# Patient Record
Sex: FEMALE | Race: BLACK OR AFRICAN AMERICAN | NOT HISPANIC OR LATINO | Employment: PART TIME | URBAN - METROPOLITAN AREA
[De-identification: names, ages, dates, MRNs, and addresses within clinical notes are randomized per-mention and may not be internally consistent; named-entity substitution may affect disease eponyms.]

---

## 2019-07-11 ENCOUNTER — OFFICE VISIT (OUTPATIENT)
Dept: FAMILY MEDICINE | Facility: CLINIC | Age: 19
End: 2019-07-11
Payer: COMMERCIAL

## 2019-07-11 VITALS
RESPIRATION RATE: 14 BRPM | HEIGHT: 65 IN | HEART RATE: 97 BPM | DIASTOLIC BLOOD PRESSURE: 68 MMHG | WEIGHT: 199.2 LBS | TEMPERATURE: 98.6 F | SYSTOLIC BLOOD PRESSURE: 110 MMHG | OXYGEN SATURATION: 94 % | BODY MASS INDEX: 33.19 KG/M2

## 2019-07-11 DIAGNOSIS — F41.1 GAD (GENERALIZED ANXIETY DISORDER): Primary | ICD-10-CM

## 2019-07-11 PROCEDURE — 99203 OFFICE O/P NEW LOW 30 MIN: CPT | Performed by: NURSE PRACTITIONER

## 2019-07-11 RX ORDER — FLUOXETINE 40 MG/1
40 CAPSULE ORAL DAILY
Qty: 90 CAPSULE | Refills: 0 | Status: SHIPPED | OUTPATIENT
Start: 2019-07-11 | End: 2019-09-06

## 2019-07-11 RX ORDER — BUPROPION HYDROCHLORIDE 150 MG/1
150 TABLET ORAL EVERY MORNING
Qty: 90 TABLET | Refills: 0 | Status: SHIPPED | OUTPATIENT
Start: 2019-07-11 | End: 2019-09-06

## 2019-07-11 SDOH — HEALTH STABILITY: MENTAL HEALTH: HOW OFTEN DO YOU HAVE A DRINK CONTAINING ALCOHOL?: NEVER

## 2019-07-11 ASSESSMENT — ENCOUNTER SYMPTOMS
WEAKNESS: 0
CHILLS: 0
PALPITATIONS: 0
NAUSEA: 0
SORE THROAT: 0
HEARTBURN: 0
NERVOUS/ANXIOUS: 1
ARTHRALGIAS: 0
SHORTNESS OF BREATH: 0
PARESTHESIAS: 0
BREAST MASS: 0
DIZZINESS: 0
HEMATURIA: 0
DYSURIA: 0
CONSTIPATION: 0
EYE PAIN: 0
HEADACHES: 1
ABDOMINAL PAIN: 0
MYALGIAS: 0
FEVER: 0
HEMATOCHEZIA: 0
DIARRHEA: 0
JOINT SWELLING: 0
COUGH: 0
FREQUENCY: 0

## 2019-07-11 ASSESSMENT — ANXIETY QUESTIONNAIRES
GAD7 TOTAL SCORE: 4
IF YOU CHECKED OFF ANY PROBLEMS ON THIS QUESTIONNAIRE, HOW DIFFICULT HAVE THESE PROBLEMS MADE IT FOR YOU TO DO YOUR WORK, TAKE CARE OF THINGS AT HOME, OR GET ALONG WITH OTHER PEOPLE: SOMEWHAT DIFFICULT
3. WORRYING TOO MUCH ABOUT DIFFERENT THINGS: SEVERAL DAYS
1. FEELING NERVOUS, ANXIOUS, OR ON EDGE: SEVERAL DAYS
5. BEING SO RESTLESS THAT IT IS HARD TO SIT STILL: NOT AT ALL
7. FEELING AFRAID AS IF SOMETHING AWFUL MIGHT HAPPEN: NOT AT ALL
6. BECOMING EASILY ANNOYED OR IRRITABLE: SEVERAL DAYS
2. NOT BEING ABLE TO STOP OR CONTROL WORRYING: SEVERAL DAYS

## 2019-07-11 ASSESSMENT — MIFFLIN-ST. JEOR: SCORE: 1670.48

## 2019-07-11 ASSESSMENT — PATIENT HEALTH QUESTIONNAIRE - PHQ9
SUM OF ALL RESPONSES TO PHQ QUESTIONS 1-9: 9
5. POOR APPETITE OR OVEREATING: NOT AT ALL

## 2019-07-11 NOTE — PATIENT INSTRUCTIONS
Start the wellbutrin in the morning.  Remember, if you have some mild side effects, these should resolve after the first week.  You should start to feel some sort of an improvement over the first 2 weeks.  Let me know if you do not.  See me in 6-8 weeks for a recheck, sooner if necessary.  Continue the prozac as you are.

## 2019-07-11 NOTE — PROGRESS NOTES
"Subjective     Mindi B Skyler Bourne is a 19 year old female who presents to clinic today for the following health issues:    HPI       Pt here to establish care.  She has been treated in the past for mood, but not recently.  She has for the last year been taking her mother's prozac.  She has slowly increased her dose and has been stable on 40mg daily.  She feels this is helpful for her anxiety.  No panic attacks.  She has social anxiety as well as generalized anxiety which cause her to isolate herself.  Currently she is having difficulty falling asleep.  She denies any symptoms of depression currently.      Patient Active Problem List   Diagnosis     Suicide attempt (H)     OLI (generalized anxiety disorder)     History reviewed. No pertinent surgical history.    Social History     Tobacco Use     Smoking status: Never Smoker     Smokeless tobacco: Never Used   Substance Use Topics     Alcohol use: Never     Frequency: Never     History reviewed. No pertinent family history.          Reviewed and updated as needed this visit by Provider  Tobacco  Allergies  Meds  Problems  Med Hx  Surg Hx  Fam Hx         Review of Systems   SEE HPI.      Objective    /68 (BP Location: Right arm, Patient Position: Chair, Cuff Size: Adult Regular)   Pulse 97   Temp 98.6  F (37  C) (Tympanic)   Resp 14   Ht 1.645 m (5' 4.75\")   Wt 90.4 kg (199 lb 3.2 oz)   LMP 06/20/2019 (Approximate)   SpO2 94%   BMI 33.41 kg/m    Body mass index is 33.41 kg/m .  Physical Exam   GENERAL: healthy, alert and no distress  PSYCH: mentation appears normal and anxious    Diagnostic Test Results:  Labs reviewed in Epic        Assessment & Plan     1. OLI (generalized anxiety disorder)  Discussed options.  Recommended counseling.  Sent with contact info to [facility], pt will schedule independently.  Continue prozac.  Add wellbutrin.  Reviewed r/b/se.  Pt agrees with plan and verbalized understanding.  - FLUoxetine " "(PROZAC) 40 MG capsule; Take 1 capsule (40 mg) by mouth daily  Dispense: 90 capsule; Refill: 0  - buPROPion (WELLBUTRIN XL) 150 MG 24 hr tablet; Take 1 tablet (150 mg) by mouth every morning  Dispense: 90 tablet; Refill: 0     BMI:   Estimated body mass index is 33.41 kg/m  as calculated from the following:    Height as of this encounter: 1.645 m (5' 4.75\").    Weight as of this encounter: 90.4 kg (199 lb 3.2 oz).     Return in about 6 weeks (around 8/22/2019).    ALFRED Rosario Ra [facility]      "

## 2019-07-11 NOTE — PROGRESS NOTES
"   SUBJECTIVE:   CC: Mindiranulfo Bourne is an 19 year old woman who presents for preventive health visit.     Healthy Habits:     Getting at least 3 servings of Calcium per day:  Yes    Bi-annual eye exam:  NO    Dental care twice a year:  NO    Sleep apnea or symptoms of sleep apnea:  None    Diet:  Vegetarian/vegan    Frequency of exercise:  1 day/week    Duration of exercise:  15-30 minutes    Taking medications regularly:  Yes    Medication side effects:  Not applicable    PHQ-2 Total Score: 1    Additional concerns today:  No          {additional problems to add (Optional):914583}    Today's PHQ-2 Score:   PHQ-2 ( 1999 [employer]) 7/11/2019   Q1: Little interest or pleasure in doing things 1   Q2: Feeling down, depressed or hopeless 0   PHQ-2 Score 1   Q1: Little interest or pleasure in doing things Several days   Q2: Feeling down, depressed or hopeless Not at all   PHQ-2 Score 1       Abuse: Current or Past(Physical, Sexual or Emotional)- No  Do you feel safe in your environment? Yes    Social History     Tobacco Use     Smoking status: Never Smoker     Smokeless tobacco: Never Used   Substance Use Topics     Alcohol use: Never     Frequency: Never         Alcohol Use 7/11/2019   Prescreen: >3 drinks/day or >7 drinks/week? Not Applicable   {add AUDIT responses (Optional) (A score of 7 for adult men is an indication of hazardous drinking; a score of 8 or more is an indication of an alcohol use disorder.  A score of 7 or more for adult women is an indication of hazardous drinking or an alchohol use disorder):096573}    Reviewed orders with patient.  Reviewed health maintenance and updated orders accordingly - { :871889::\"Yes\"}  {Chronicprobdata (optional):886538}    {Mammo Decision Support (Optional):287986}    Pertinent mammograms are reviewed under the imaging tab.  History of abnormal Pap smear: { :317906}     Reviewed and updated as needed this visit by clinical staff  Tobacco  Allergies  Meds  Med Hx  Surg " "Hx  Fam Hx  Soc Hx        Reviewed and updated as needed this visit by Provider        {HISTORY OPTIONS (Optional):595208}    Review of Systems   Constitutional: Negative for chills and fever.   HENT: Positive for ear pain. Negative for congestion, hearing loss and sore throat.    Eyes: Negative for pain and visual disturbance.   Respiratory: Negative for cough and shortness of breath.    Cardiovascular: Negative for chest pain, palpitations and peripheral edema.   Gastrointestinal: Negative for abdominal pain, constipation, diarrhea, heartburn, hematochezia and nausea.   Breasts:  Negative for tenderness, breast mass and discharge.   Genitourinary: Negative for dysuria, frequency, genital sores, hematuria, pelvic pain, urgency, vaginal bleeding and vaginal discharge.   Musculoskeletal: Negative for arthralgias, joint swelling and myalgias.   Skin: Negative for rash.   Neurological: Positive for headaches. Negative for dizziness, weakness and paresthesias.   Psychiatric/Behavioral: Positive for mood changes. The patient is nervous/anxious.      {FEMALE ROS (Optional):702885}     OBJECTIVE:   There were no vitals taken for this visit.  Physical Exam  {Exam Choices (Optional):364859}    {Diagnostic Test Results (Optional):181039::\"Diagnostic Test Results:\",\"Labs reviewed in Epic\"}    ASSESSMENT/PLAN:   {Diag Picklist:398753}    COUNSELING:  {FEMALE COUNSELING MESSAGES:212519::\"Reviewed preventive health counseling, as reflected in patient instructions\"}    Estimated body mass index is 202.36 kg/m  as calculated from the following:    Height as of 7/28/13: 0.633 m (2' 0.92\").    Weight as of 7/28/13: 81.1 kg (178 lb 12 oz).    {Weight Management Plan (ACO) Complete if BMI is abnormal-  Ages 18-64  BMI >24.9.  Age 65+ with BMI <23 or >30 (Optional):276559}     reports that she has never smoked. She has never used smokeless tobacco.  {Tobacco Cessation -- Complete if patient is a smoker (Optional):702373}    Counseling " Resources:  ATP IV Guidelines  Pooled Cohorts Equation Calculator  Breast Cancer Risk Calculator  FRAX Risk Assessment  ICSI Preventive Guidelines  Dietary Guidelines for Americans, 2010  USDA's MyPlate  ASA Prophylaxis  Lung CA Screening    ALFRED Rosario Ra, CNP  [facility]

## 2019-07-12 ASSESSMENT — ANXIETY QUESTIONNAIRES: GAD7 TOTAL SCORE: 4

## 2019-09-05 NOTE — PROGRESS NOTES
Subjective     Mindi DUNCAN Skyler Bourne is a 19 year old female who presents to clinic today for the following health issues:    HPI   Depression and Anxiety Follow-Up    How are you doing with your depression since your last visit? Improved     How are you doing with your anxiety since your last visit?  Improved     Are you having other symptoms that might be associated with depression or anxiety? No    Have you had a significant life event? No     Do you have any concerns with your use of alcohol or other drugs? No    Social History     Tobacco Use     Smoking status: Never Smoker     Smokeless tobacco: Never Used   Substance Use Topics     Alcohol use: Never     Frequency: Never     Drug use: None     PHQ 7/11/2019 9/6/2019   PHQ-9 Total Score 9 4   Q9: Thoughts of better off dead/self-harm past 2 weeks Not at all Not at all     OLI-7 SCORE 7/11/2019 9/6/2019   Total Score 4 6           Suicide Assessment Five-step Evaluation and Treatment (SAFE-T)      How many servings of fruits and vegetables do you eat daily?  0-1    On average, how many sweetened beverages do you drink each day (soda, juice, sweet tea, etc)?   0    How many days per week do you miss taking your medication? 0    Pt here for follow up.  Symptoms improved with addition of wellbutrin to prozac.  Has been more active with family, less isolated.  Lower anxiety.  Wonders about dose increase to help further.  Made appt with therapist but needed to reschedule.  Plans on pursuing this.  Some trouble sleeping over the past 1 week.  This happens on occasion.    Patient Active Problem List   Diagnosis     Suicide attempt (H)     OLI (generalized anxiety disorder)     History reviewed. No pertinent surgical history.    Social History     Tobacco Use     Smoking status: Never Smoker     Smokeless tobacco: Never Used   Substance Use Topics     Alcohol use: Never     Frequency: Never     History reviewed. No pertinent family history.          Reviewed and updated  "as needed this visit by Provider  Tobacco  Allergies  Meds  Problems  Med Hx  Surg Hx  Fam Hx         Review of Systems   SEE HPI.      Objective    /64 (BP Location: Right arm, Patient Position: Chair, Cuff Size: Adult Large)   Pulse 103   Temp 98.6  F (37  C) (Tympanic)   Resp 16   Wt 91.8 kg (202 lb 4.8 oz)   LMP 08/16/2019 (Approximate)   SpO2 95%   BMI 33.92 kg/m    Body mass index is 33.92 kg/m .  Physical Exam   GENERAL: healthy, alert and no distress  PSYCH: mentation appears normal, affect normal/bright    Diagnostic Test Results:  Labs reviewed in Epic        Assessment & Plan     1. OLI (generalized anxiety disorder)  Discussed options with pt and mom.  Mom with hx of rapid metabolizing.  Possible Mindi also metabolizes fast as she has required higher doses of prozac.  Increase prozac now.  Consider wellbutrin increase in the future.  Pt agrees with plan and verbalized understanding.  - FLUoxetine (PROZAC) 20 MG capsule; Take 3 capsules (60 mg) by mouth daily  Dispense: 270 capsule; Refill: 1  - buPROPion (WELLBUTRIN XL) 150 MG 24 hr tablet; Take 1 tablet (150 mg) by mouth every morning  Dispense: 90 tablet; Refill: 0    2. Insomnia, unspecified type  Discussed options.  Start mirtazapine.  Okay to use this prn.  Reviewed r/b/se.  Pt agrees with plan and verbalized understanding.  - FLUoxetine (PROZAC) 20 MG capsule; Take 3 capsules (60 mg) by mouth daily  Dispense: 270 capsule; Refill: 1  - mirtazapine (REMERON) 7.5 MG tablet; Take 1 tablet (7.5 mg) by mouth At Bedtime  Dispense: 30 tablet; Refill: 1     BMI:   Estimated body mass index is 33.92 kg/m  as calculated from the following:    Height as of 7/11/19: 1.645 m (5' 4.75\").    Weight as of this encounter: 91.8 kg (202 lb 4.8 oz).     Return in about 2 months (around 11/6/2019) for follow up.    Kassi Montiel, APRN CNP  [facility]      "

## 2019-09-06 ENCOUNTER — OFFICE VISIT (OUTPATIENT)
Dept: FAMILY MEDICINE | Facility: CLINIC | Age: 19
End: 2019-09-06
Payer: COMMERCIAL

## 2019-09-06 VITALS
OXYGEN SATURATION: 95 % | TEMPERATURE: 98.6 F | DIASTOLIC BLOOD PRESSURE: 64 MMHG | SYSTOLIC BLOOD PRESSURE: 100 MMHG | RESPIRATION RATE: 16 BRPM | BODY MASS INDEX: 33.92 KG/M2 | WEIGHT: 202.3 LBS | HEART RATE: 103 BPM

## 2019-09-06 DIAGNOSIS — G47.00 INSOMNIA, UNSPECIFIED TYPE: ICD-10-CM

## 2019-09-06 DIAGNOSIS — F41.1 GAD (GENERALIZED ANXIETY DISORDER): Primary | ICD-10-CM

## 2019-09-06 PROCEDURE — 99214 OFFICE O/P EST MOD 30 MIN: CPT | Performed by: NURSE PRACTITIONER

## 2019-09-06 RX ORDER — MIRTAZAPINE 7.5 MG/1
7.5 TABLET, FILM COATED ORAL AT BEDTIME
Qty: 30 TABLET | Refills: 1 | Status: SHIPPED | OUTPATIENT
Start: 2019-09-06 | End: 2019-11-06

## 2019-09-06 RX ORDER — BUPROPION HYDROCHLORIDE 150 MG/1
150 TABLET ORAL EVERY MORNING
Qty: 90 TABLET | Refills: 0 | Status: SHIPPED | OUTPATIENT
Start: 2019-09-06 | End: 2019-11-06

## 2019-09-06 ASSESSMENT — PATIENT HEALTH QUESTIONNAIRE - PHQ9
5. POOR APPETITE OR OVEREATING: SEVERAL DAYS
SUM OF ALL RESPONSES TO PHQ QUESTIONS 1-9: 4

## 2019-09-06 ASSESSMENT — ANXIETY QUESTIONNAIRES
1. FEELING NERVOUS, ANXIOUS, OR ON EDGE: SEVERAL DAYS
3. WORRYING TOO MUCH ABOUT DIFFERENT THINGS: SEVERAL DAYS
GAD7 TOTAL SCORE: 6
6. BECOMING EASILY ANNOYED OR IRRITABLE: SEVERAL DAYS
2. NOT BEING ABLE TO STOP OR CONTROL WORRYING: NOT AT ALL
7. FEELING AFRAID AS IF SOMETHING AWFUL MIGHT HAPPEN: SEVERAL DAYS
5. BEING SO RESTLESS THAT IT IS HARD TO SIT STILL: SEVERAL DAYS

## 2019-09-06 NOTE — PATIENT INSTRUCTIONS
Start the increased dose of prozac (60mg daily).    Continue the wellbutrin as you are.    If your sleep continues to be an issue go ahead and start the mirtazapine- you can take this for a short time, or if it is helping with anxiety you can continue it as a daily medication.    Continue with the plan for counseling.

## 2019-09-07 ASSESSMENT — ANXIETY QUESTIONNAIRES: GAD7 TOTAL SCORE: 6

## 2019-09-30 DIAGNOSIS — G47.00 INSOMNIA, UNSPECIFIED TYPE: ICD-10-CM

## 2019-09-30 NOTE — TELEPHONE ENCOUNTER
"Requested Prescriptions   Pending Prescriptions Disp Refills     mirtazapine (REMERON) 7.5 MG tablet [Pharmacy Med Name: MIRTAZAPINE 7.5 MG TABLET] 30 tablet 1     Sig: TAKE 1 TABLET (7.5 MG) BY MOUTH AT BEDTIME   Last Written Prescription Date:  9/6/19  Last Fill Quantity: 30,  # refills: 1   Last office visit: 9/6/2019 with prescribing provider:  Kassi Montiel Ra, APRN CNP   Future Office Visit:        Atypical Antidepressants Protocol Passed - 9/30/2019 11:47 AM        Passed - Recent (12 mo) or future (30 days) visit within the authorizing provider's specialty     Patient has had an office visit with the authorizing provider or a provider within the authorizing providers department within the previous 12 mos or has a future within next 30 days. See \"Patient Info\" tab in inbasket, or \"Choose Columns\" in Meds & Orders section of the refill encounter.              Passed - Medication active on med list        Passed - Patient is age 18 or older        Passed - No active pregnancy on record        Passed - No positive pregnancy test in past 12 mos          "

## 2019-10-01 DIAGNOSIS — F41.1 GAD (GENERALIZED ANXIETY DISORDER): ICD-10-CM

## 2019-10-01 NOTE — TELEPHONE ENCOUNTER
"Requested Prescriptions   Pending Prescriptions Disp Refills     FLUoxetine (PROZAC) 40 MG capsule [Pharmacy Med Name: FLUOXETINE HCL 40 MG CAPSULE] 90 capsule 0     Sig: TAKE 1 CAPSULE BY MOUTH EVERY DAY   Last Written Prescription Date:  9/6/19  Last Fill Quantity: 270,  # refills: 1   Last office visit: 9/6/2019 with prescribing provider:  Kassi Montiel Ra, APRN CNP   Future Office Visit:        SSRIs Protocol Passed - 10/1/2019  1:38 AM   PHQ-9 SCORE 7/11/2019 9/6/2019   PHQ-9 Total Score 9 4     OLI-7 SCORE 7/11/2019 9/6/2019   Total Score 4 6            Passed - Recent (12 mo) or future (30 days) visit within the authorizing provider's specialty     Patient has had an office visit with the authorizing provider or a provider within the authorizing providers department within the previous 12 mos or has a future within next 30 days. See \"Patient Info\" tab in inbasket, or \"Choose Columns\" in Meds & Orders section of the refill encounter.              Passed - Medication is active on med list        Passed - Patient is age 18 or older        Passed - No active pregnancy on record        Passed - No positive pregnancy test in last 12 months          "

## 2019-10-02 RX ORDER — FLUOXETINE 40 MG/1
CAPSULE ORAL
Qty: 90 CAPSULE | Refills: 0 | OUTPATIENT
Start: 2019-10-02

## 2019-10-02 RX ORDER — MIRTAZAPINE 7.5 MG/1
7.5 TABLET, FILM COATED ORAL AT BEDTIME
Qty: 30 TABLET | Refills: 1 | OUTPATIENT
Start: 2019-10-02

## 2019-10-02 NOTE — TELEPHONE ENCOUNTER
Refused noting that it is to early to fill. Noted to see the 9/6/19 Rx.    Rita Bailey RN, [facility] Triage

## 2019-10-03 ENCOUNTER — HEALTH MAINTENANCE LETTER (OUTPATIENT)
Age: 19
End: 2019-10-03

## 2019-10-29 ENCOUNTER — TELEPHONE (OUTPATIENT)
Dept: FAMILY MEDICINE | Facility: CLINIC | Age: 19
End: 2019-10-29

## 2019-10-29 NOTE — TELEPHONE ENCOUNTER
Reason for call:  Other   Patient called regarding (reason for call): appointment  Additional comments: Needs med ck before 11/08 - requesting appointment with Kassi Montiel     Phone number to reach patient:   phone number:  *[phone]    Best Time:  Any    Can we leave a detailed message on this number?  No             Jihan Hill on 10/29/2019 at 3:23 PM

## 2019-10-30 NOTE — TELEPHONE ENCOUNTER
Called patient, states she will call back with schedule.  Offered patient TANVI with MAURO on Monday 11/4/2019 or Wednesday 11/6/2019.    Ivy Cabral CMA

## 2019-11-06 ENCOUNTER — OFFICE VISIT (OUTPATIENT)
Dept: FAMILY MEDICINE | Facility: CLINIC | Age: 19
End: 2019-11-06
Payer: COMMERCIAL

## 2019-11-06 VITALS
WEIGHT: 204.7 LBS | RESPIRATION RATE: 16 BRPM | DIASTOLIC BLOOD PRESSURE: 64 MMHG | SYSTOLIC BLOOD PRESSURE: 102 MMHG | TEMPERATURE: 98.4 F | OXYGEN SATURATION: 94 % | HEART RATE: 103 BPM | BODY MASS INDEX: 34.33 KG/M2

## 2019-11-06 DIAGNOSIS — G47.00 INSOMNIA, UNSPECIFIED TYPE: ICD-10-CM

## 2019-11-06 DIAGNOSIS — F41.1 GAD (GENERALIZED ANXIETY DISORDER): ICD-10-CM

## 2019-11-06 PROCEDURE — 99213 OFFICE O/P EST LOW 20 MIN: CPT | Performed by: NURSE PRACTITIONER

## 2019-11-06 RX ORDER — MIRTAZAPINE 7.5 MG/1
7.5 TABLET, FILM COATED ORAL AT BEDTIME
Qty: 30 TABLET | Refills: 1 | Status: SHIPPED | OUTPATIENT
Start: 2019-11-06 | End: 2020-06-02

## 2019-11-06 RX ORDER — HYDROXYZINE PAMOATE 25 MG/1
25-50 CAPSULE ORAL DAILY PRN
Qty: 30 CAPSULE | Refills: 0 | Status: SHIPPED | OUTPATIENT
Start: 2019-11-06 | End: 2021-07-30

## 2019-11-06 RX ORDER — BUPROPION HYDROCHLORIDE 150 MG/1
150 TABLET ORAL EVERY MORNING
Qty: 90 TABLET | Refills: 0 | Status: SHIPPED | OUTPATIENT
Start: 2019-11-06 | End: 2020-01-27

## 2019-11-06 ASSESSMENT — ANXIETY QUESTIONNAIRES
3. WORRYING TOO MUCH ABOUT DIFFERENT THINGS: NOT AT ALL
6. BECOMING EASILY ANNOYED OR IRRITABLE: SEVERAL DAYS
7. FEELING AFRAID AS IF SOMETHING AWFUL MIGHT HAPPEN: NOT AT ALL
1. FEELING NERVOUS, ANXIOUS, OR ON EDGE: SEVERAL DAYS
5. BEING SO RESTLESS THAT IT IS HARD TO SIT STILL: NOT AT ALL
GAD7 TOTAL SCORE: 2
2. NOT BEING ABLE TO STOP OR CONTROL WORRYING: NOT AT ALL

## 2019-11-06 ASSESSMENT — PATIENT HEALTH QUESTIONNAIRE - PHQ9
SUM OF ALL RESPONSES TO PHQ QUESTIONS 1-9: 6
5. POOR APPETITE OR OVEREATING: NOT AT ALL

## 2019-11-06 NOTE — PROGRESS NOTES
Subjective     Mindi DUNCAN Skyler Bourne is a 19 year old female who presents to clinic today for the following health issues:    HPI   Anxiety Follow-Up    How are you doing with your anxiety since your last visit? Improved     Are you having other symptoms that might be associated with anxiety? No    Have you had a significant life event? No     Are you feeling depressed? No    Do you have any concerns with your use of alcohol or other drugs? No    Social History     Tobacco Use     Smoking status: Never Smoker     Smokeless tobacco: Never Used   Substance Use Topics     Alcohol use: Never     Frequency: Never     Drug use: None     OLI-7 SCORE 7/11/2019 9/6/2019 11/6/2019   Total Score 4 6 2     PHQ 7/11/2019 9/6/2019 11/6/2019   PHQ-9 Total Score 9 4 6   Q9: Thoughts of better off dead/self-harm past 2 weeks Not at all Not at all Not at all           How many servings of fruits and vegetables do you eat daily?  0-1    On average, how many sweetened beverages do you drink each day (soda, juice, sweet tea, etc)?   0  How many days per week do you miss taking your medication? 1    What makes it hard for you to take your medications?  remembering to take    Pt continues to do well with meds.  Did not start mirtazapine.  Will trial now.  She is concerned about her upcoming trip.  Interested in as needed med such as hydroxyzine.  Started counseling, going well.      Patient Active Problem List   Diagnosis     Suicide attempt (H)     OLI (generalized anxiety disorder)     History reviewed. No pertinent surgical history.    Social History     Tobacco Use     Smoking status: Never Smoker     Smokeless tobacco: Never Used   Substance Use Topics     Alcohol use: Never     Frequency: Never     History reviewed. No pertinent family history.          Reviewed and updated as needed this visit by Provider  Tobacco  Allergies  Meds  Problems  Med Hx  Surg Hx  Fam Hx         Review of Systems   SEE HPI.      Objective    BP  "102/64 (BP Location: Right arm, Patient Position: Chair, Cuff Size: Adult Large)   Pulse 103   Temp 98.4  F (36.9  C) (Tympanic)   Resp 16   Wt 92.9 kg (204 lb 11.2 oz)   LMP 10/15/2019 (Approximate)   SpO2 94%   BMI 34.33 kg/m    Body mass index is 34.33 kg/m .  Physical Exam   GENERAL: healthy, alert and no distress  PSYCH: mentation appears normal, affect normal/bright    Diagnostic Test Results:  Labs reviewed in Epic        Assessment & Plan     1. OLI (generalized anxiety disorder)  Discussed options.  Refilled meds, hydroxyzine prn anxiety in the airport or on trip.  - buPROPion (WELLBUTRIN XL) 150 MG 24 hr tablet; Take 1 tablet (150 mg) by mouth every morning  Dispense: 90 tablet; Refill: 0  - FLUoxetine (PROZAC) 20 MG capsule; Take 3 capsules (60 mg) by mouth daily  Dispense: 270 capsule; Refill: 1  - hydrOXYzine (VISTARIL) 25 MG capsule; Take 1-2 capsules (25-50 mg) by mouth daily as needed for anxiety  Dispense: 30 capsule; Refill: 0    2. Insomnia, unspecified type  Continue meds, trial mirtazapine before trip.  - FLUoxetine (PROZAC) 20 MG capsule; Take 3 capsules (60 mg) by mouth daily  Dispense: 270 capsule; Refill: 1  - mirtazapine (REMERON) 7.5 MG tablet; Take 1 tablet (7.5 mg) by mouth At Bedtime  Dispense: 30 tablet; Refill: 1     BMI:   Estimated body mass index is 34.33 kg/m  as calculated from the following:    Height as of 7/11/19: 1.645 m (5' 4.75\").    Weight as of this encounter: 92.9 kg (204 lb 11.2 oz).       Return in about 3 months (around 2/6/2020) for follow up.    ALFRED Rosario Ra [facility]      "

## 2019-11-07 ASSESSMENT — ANXIETY QUESTIONNAIRES: GAD7 TOTAL SCORE: 2

## 2019-12-11 DIAGNOSIS — G47.00 INSOMNIA, UNSPECIFIED TYPE: ICD-10-CM

## 2019-12-12 RX ORDER — MIRTAZAPINE 7.5 MG/1
7.5 TABLET, FILM COATED ORAL AT BEDTIME
Qty: 30 TABLET | Refills: 1 | Status: SHIPPED | OUTPATIENT
Start: 2019-12-12 | End: 2020-01-02

## 2019-12-12 NOTE — TELEPHONE ENCOUNTER
"Requested Prescriptions   Signed Prescriptions Disp Refills    mirtazapine (REMERON) 7.5 MG tablet 30 tablet 1     Sig: TAKE 1 TABLET (7.5 MG) BY MOUTH AT BEDTIME       Atypical Antidepressants Protocol Passed - 12/11/2019  7:37 AM        Passed - Recent (12 mo) or future (30 days) visit within the authorizing provider's specialty     Patient has had an office visit with the authorizing provider or a provider within the authorizing providers department within the previous 12 mos or has a future within next 30 days. See \"Patient Info\" tab in inbasket, or \"Choose Columns\" in Meds & Orders section of the refill encounter.              Passed - Medication active on med list        Passed - Patient is age 18 or older        Passed - No active pregnancy on record        Passed - No positive pregnancy test in past 12 mos          "

## 2019-12-31 DIAGNOSIS — G47.00 INSOMNIA, UNSPECIFIED TYPE: ICD-10-CM

## 2020-01-02 RX ORDER — MIRTAZAPINE 7.5 MG/1
7.5 TABLET, FILM COATED ORAL AT BEDTIME
Qty: 90 TABLET | Refills: 1 | Status: SHIPPED | OUTPATIENT
Start: 2020-01-02 | End: 2020-06-02

## 2020-01-27 DIAGNOSIS — F41.1 GAD (GENERALIZED ANXIETY DISORDER): ICD-10-CM

## 2020-01-27 NOTE — TELEPHONE ENCOUNTER
Routing refill request to provider for review/approval because:  Patient needs to be seen because:  Due for f/u appt and phq9 > 4    Will forward to the station, please try and help the pt schedule an appt for a med check/mood recheck

## 2020-01-27 NOTE — TELEPHONE ENCOUNTER
"Requested Prescriptions   Pending Prescriptions Disp Refills     buPROPion (WELLBUTRIN XL) 150 MG 24 hr tablet [Pharmacy Med Name:  Last Written Prescription Date:  11/6/19  Last Fill Quantity: 90,  # refills: 0   Last Office Visit: 11/6/2019   Future Office Visit:      BUPROPION HCL  MG TABLET] 90 tablet 0     Sig: TAKE 1 TABLET BY MOUTH EVERY MORNING       SSRIs Protocol Passed - 1/27/2020  1:50 AM   PHQ-9 SCORE 7/11/2019 9/6/2019 11/6/2019   PHQ-9 Total Score 9 4 6     OLI-7 SCORE 7/11/2019 9/6/2019 11/6/2019   Total Score 4 6 2        Passed - Recent (12 mo) or future (30 days) visit within the authorizing provider's specialty     Patient has had an office visit with the authorizing provider or a provider within the authorizing providers department within the previous 12 mos or has a future within next 30 days. See \"Patient Info\" tab in inbasket, or \"Choose Columns\" in Meds & Orders section of the refill encounter.              Passed - Medication is Bupropion     If the medication is Bupropion (Wellbutrin), and the patient is taking for smoking cessation; OK to refill.          Passed - Medication is active on med list        Passed - Patient is age 18 or older        Passed - No active pregnancy on record        Passed - No positive pregnancy test in last 12 months          "

## 2020-01-29 RX ORDER — BUPROPION HYDROCHLORIDE 150 MG/1
TABLET ORAL
Qty: 90 TABLET | Refills: 0 | Status: SHIPPED | OUTPATIENT
Start: 2020-01-29 | End: 2020-06-02

## 2020-03-18 ENCOUNTER — TELEPHONE (OUTPATIENT)
Dept: FAMILY MEDICINE | Facility: CLINIC | Age: 20
End: 2020-03-18

## 2020-03-18 NOTE — TELEPHONE ENCOUNTER
"Reason for call:  Symptom   Symptom or request: Possible Bartholin's gland cyst.     Duration (how long have symptoms been present): started yesterday.   Have you been treated for this before? No    Additional comments: currently the size of a \"fat pinky\" and very painful.     Phone number to reach patient:  Cell number on file:    Telephone Information:   Mobile [phone]       Best Time:  any    Can we leave a detailed message on this number?  YES    Travel screening: Not Applicable       Jihan Hill on 3/18/2020 at 12:26 PM    "

## 2020-03-18 NOTE — TELEPHONE ENCOUNTER
Called the pt to discuss.    Yesterday afternoon, she discovered a cyst on her labia.  She thinks it has doubled in size since then.  It is hard for her to walk.  She has taken a bath with epson salt.  That does not seem to be doing anything.  She says it is pretty big.      Had discussed with Coretta Zuluaga.  She had advised if it is really big, she could do an e-visit.      Advised pt to submit an e-visit.  Directions given.

## 2020-05-31 DIAGNOSIS — F41.1 GAD (GENERALIZED ANXIETY DISORDER): ICD-10-CM

## 2020-06-01 ENCOUNTER — MYC MEDICAL ADVICE (OUTPATIENT)
Dept: FAMILY MEDICINE | Facility: CLINIC | Age: 20
End: 2020-06-01

## 2020-06-01 RX ORDER — BUPROPION HYDROCHLORIDE 150 MG/1
TABLET ORAL
Qty: 30 TABLET | Refills: 0 | OUTPATIENT
Start: 2020-06-01

## 2020-06-01 NOTE — PROGRESS NOTES
"Mindi Bourne is a 20 year old female who is being evaluated via a billable telephone visit.      The patient has been notified of following:     \"This telephone visit will be conducted via a call between you and your physician/provider. We have found that certain health care needs can be provided without the need for a physical exam.  This service lets us provide the care you need with a short phone conversation.  If a prescription is necessary we can send it directly to your pharmacy.  If lab work is needed we can place an order for that and you can then stop by our lab to have the test done at a later time.    Telephone visits are billed at different rates depending on your insurance coverage. During this emergency period, for some insurers they may be billed the same as an in-person visit.  Please reach out to your insurance provider with any questions.    If during the course of the call the physician/provider feels a telephone visit is not appropriate, you will not be charged for this service.\"    Patient has given verbal consent for Telephone visit?  Yes    What phone number would you like to be contacted at? [phone]    How would you like to obtain your AVS? Mail a copy    Subjective     Mindi Bourne is a 20 year old female who presents via phone visit today for the following health issues:    HPI  Anxiety Follow-Up    How are you doing with your anxiety since your last visit? Improved     Are you having other symptoms that might be associated with anxiety? Yes:  Troubles Sleeping    Have you had a significant life event? No     Are you feeling depressed? No    Do you have any concerns with your use of alcohol or other drugs? No    Social History     Tobacco Use     Smoking status: Never Smoker     Smokeless tobacco: Never Used   Substance Use Topics     Alcohol use: Never     Frequency: Never     Drug use: Never     OLI-7 SCORE 9/6/2019 11/6/2019 6/2/2020   Total Score 6 2 2     PHQ " 9/6/2019 11/6/2019 6/2/2020   PHQ-9 Total Score 4 6 3   Q9: Thoughts of better off dead/self-harm past 2 weeks Not at all Not at all Not at all     Doing well.  Was having issues with sleep but again is sleeping well.  Mood has been stable.  Anxiety well controlled.  Compliant with meds.      How many servings of fruits and vegetables do you eat daily?  0-1    On average, how many sweetened beverages do you drink each day (Examples: soda, juice, sweet tea, etc.  Do NOT count diet or artificially sweetened beverages)?   1    How many days per week do you exercise enough to make your heart beat faster? 3 or less    How many minutes a day do you exercise enough to make your heart beat faster? 20 - 29    How many days per week do you miss taking your medication? 0        Patient Active Problem List   Diagnosis     Suicide attempt (H)     OLI (generalized anxiety disorder)     History reviewed. No pertinent surgical history.    Social History     Tobacco Use     Smoking status: Never Smoker     Smokeless tobacco: Never Used   Substance Use Topics     Alcohol use: Never     Frequency: Never     History reviewed. No pertinent family history.        Reviewed and updated as needed this visit by Provider  Tobacco  Allergies  Meds  Problems  Med Hx  Surg Hx  Fam Hx         Review of Systems   Constitutional, HEENT, cardiovascular, pulmonary, gi and gu systems are negative, except as otherwise noted.       Objective   Reported vitals:  There were no vitals taken for this visit.   healthy, alert and no distress  PSYCH: Alert and oriented times 3; coherent speech, normal   rate and volume, able to articulate logical thoughts, able   to abstract reason, no tangential thoughts, no hallucinations   or delusions  Her affect is normal  RESP: No cough, no audible wheezing, able to talk in full sentences  Remainder of exam unable to be completed due to telephone visits    Diagnostic Test Results:  none          Assessment/Plan:  1. OLI (generalized anxiety disorder)  Asymptomatic.  Well controlled.  Continue with current plan.  - buPROPion (WELLBUTRIN XL) 150 MG 24 hr tablet; Take 1 tablet (150 mg) by mouth every morning  Dispense: 90 tablet; Refill: 1  - FLUoxetine (PROZAC) 20 MG capsule; Take 3 capsules (60 mg) by mouth daily  Dispense: 270 capsule; Refill: 1    2. Insomnia, unspecified type  Monitor.  Doing well currently.  - FLUoxetine (PROZAC) 20 MG capsule; Take 3 capsules (60 mg) by mouth daily  Dispense: 270 capsule; Refill: 1    Return in about 6 months (around 12/2/2020) for follow up.      Phone call duration:  5 minutes    ALFRED Rosario Ra CNP

## 2020-06-01 NOTE — TELEPHONE ENCOUNTER
Due for an appt for anxiety to f/u.  Will forward to the station, please try and help the pt schedule an appt for anxiety f/u.  After appt scheduled, forward back to the RN refill pool.  Thanks!

## 2020-06-02 ENCOUNTER — VIRTUAL VISIT (OUTPATIENT)
Dept: FAMILY MEDICINE | Facility: CLINIC | Age: 20
End: 2020-06-02
Payer: COMMERCIAL

## 2020-06-02 DIAGNOSIS — G47.00 INSOMNIA, UNSPECIFIED TYPE: ICD-10-CM

## 2020-06-02 DIAGNOSIS — F41.1 GAD (GENERALIZED ANXIETY DISORDER): ICD-10-CM

## 2020-06-02 PROCEDURE — 99214 OFFICE O/P EST MOD 30 MIN: CPT | Mod: 95 | Performed by: NURSE PRACTITIONER

## 2020-06-02 RX ORDER — BUPROPION HYDROCHLORIDE 150 MG/1
150 TABLET ORAL EVERY MORNING
Qty: 90 TABLET | Refills: 1 | Status: SHIPPED | OUTPATIENT
Start: 2020-06-02 | End: 2021-02-24

## 2020-06-02 ASSESSMENT — ANXIETY QUESTIONNAIRES
GAD7 TOTAL SCORE: 2
2. NOT BEING ABLE TO STOP OR CONTROL WORRYING: NOT AT ALL
7. FEELING AFRAID AS IF SOMETHING AWFUL MIGHT HAPPEN: SEVERAL DAYS
IF YOU CHECKED OFF ANY PROBLEMS ON THIS QUESTIONNAIRE, HOW DIFFICULT HAVE THESE PROBLEMS MADE IT FOR YOU TO DO YOUR WORK, TAKE CARE OF THINGS AT HOME, OR GET ALONG WITH OTHER PEOPLE: NOT DIFFICULT AT ALL
5. BEING SO RESTLESS THAT IT IS HARD TO SIT STILL: NOT AT ALL
6. BECOMING EASILY ANNOYED OR IRRITABLE: SEVERAL DAYS
3. WORRYING TOO MUCH ABOUT DIFFERENT THINGS: NOT AT ALL
1. FEELING NERVOUS, ANXIOUS, OR ON EDGE: NOT AT ALL

## 2020-06-02 ASSESSMENT — PATIENT HEALTH QUESTIONNAIRE - PHQ9
SUM OF ALL RESPONSES TO PHQ QUESTIONS 1-9: 3
5. POOR APPETITE OR OVEREATING: NOT AT ALL

## 2020-06-03 ASSESSMENT — ANXIETY QUESTIONNAIRES: GAD7 TOTAL SCORE: 2

## 2020-07-13 ENCOUNTER — TELEPHONE (OUTPATIENT)
Dept: FAMILY MEDICINE | Facility: CLINIC | Age: 20
End: 2020-07-13

## 2020-07-13 NOTE — TELEPHONE ENCOUNTER
Panel Management Review      Patient has the following on her problem list: None      Composite cancer screening  Chart review shows that this patient is due/due soon for the following Pap Smear  Summary:    Patient is due/failing the following:   PAP and PHYSICAL    Action needed:   Patient needs office visit for physical/ pap.    Type of outreach:    Sent [employer] message.    Questions for provider review:    None                                                                                                                                    Ivy Cabral CMA       Chart routed to Care Team .

## 2020-11-07 ENCOUNTER — HEALTH MAINTENANCE LETTER (OUTPATIENT)
Age: 20
End: 2020-11-07

## 2021-01-30 DIAGNOSIS — G47.00 INSOMNIA, UNSPECIFIED TYPE: ICD-10-CM

## 2021-01-30 DIAGNOSIS — F41.1 GAD (GENERALIZED ANXIETY DISORDER): ICD-10-CM

## 2021-01-30 NOTE — LETTER
1/30/2021        RE: Mindi Bourne  [address] [facility] [address]        Dear Mindi,   We are contacting you today to notify you that you are due for a medication follow up for further refills. Please call [phone] to schedule an appointment.        Thank you,      -[facility] Team

## 2021-02-01 NOTE — TELEPHONE ENCOUNTER
Attempt #1, [employer] message sent to schedule OV or virtual visit.     Korin Rahman-

## 2021-02-01 NOTE — TELEPHONE ENCOUNTER
Medication is being filled for 1 time refill only due to:  Patient needs to be seen because she is due for her 6 month follow up med check. Please contact patient and schedule her 6 month med check for further refills.     Itzel Hickman RN

## 2021-02-22 DIAGNOSIS — F41.1 GAD (GENERALIZED ANXIETY DISORDER): ICD-10-CM

## 2021-02-22 NOTE — LETTER
February 25, 2021      Mindi Bourne  [address] [place] [address]        Dear Ms. Mindi Bourne,    We recently received a call from your pharmacy requesting a refill of your medication Bupropion.    We are contacting you today to notify you that you are due for a medication check for further refills.    We have authorized one refill of your medication to allow time for you to schedule your appointment.    This appointment can be in clinic or virtual by either telephone, video.    Please call [phone] to schedule an appointment or if you have MyChart you can schedule with your provider as well.    Taking care of your health is important to us, an ongoing visits with your provider are vital to your care. We look forward to seeing you in the near future.    Thank you for using [facility] for your Medical Needs.          Sincerely,     Kassi Montiel, MSN, FNP-BC

## 2021-02-24 RX ORDER — BUPROPION HYDROCHLORIDE 150 MG/1
TABLET ORAL
Qty: 30 TABLET | Refills: 0 | Status: SHIPPED | OUTPATIENT
Start: 2021-02-24 | End: 2021-03-19

## 2021-02-24 NOTE — TELEPHONE ENCOUNTER
Medication is being filled for 1 time refill only due to:  Patient needs to be seen because due for anxiety f/u.     Will forward to the station, please try to help the pt schedule an appt for anxiety f/u.  Thanks!

## 2021-03-19 DIAGNOSIS — G47.00 INSOMNIA, UNSPECIFIED TYPE: ICD-10-CM

## 2021-03-19 DIAGNOSIS — F41.1 GAD (GENERALIZED ANXIETY DISORDER): ICD-10-CM

## 2021-03-19 RX ORDER — BUPROPION HYDROCHLORIDE 150 MG/1
TABLET ORAL
Qty: 30 TABLET | Refills: 0 | Status: SHIPPED | OUTPATIENT
Start: 2021-03-19 | End: 2021-05-03

## 2021-03-19 NOTE — TELEPHONE ENCOUNTER
Medication is being filled for 1 time refill only due to:  Patient needs to be seen because patient is due for her 6 month medication check. Please contact patient and schedule her for her 6 month med check for further refills.     Itzel Hickman RN

## 2021-03-19 NOTE — LETTER
March 19, 2021      Mindi Buorne  [address] [place] [address]        Dear Ms. Mindi Bourne,    We recently received a call from your pharmacy requesting a refill of your medication Fluoxetine & Bupropion.    We are contacting you today to notify you that you are due for a 6 month medication check for further refills.    We have authorized one refill of your medication to allow time for you to schedule your appointment.    This appointment can be in clinic or virtual by either telephone, video.    Please call [phone] to schedule an appointment or if you have MyChart you can schedule with your provider as well.    Taking care of your health is important to us, an ongoing visits with your provider are vital to your care. We look forward to seeing you in the near future.    Thank you for using [facility] for your Medical Needs.          Sincerely,     Kassi Montiel, MSN, FNP-BC

## 2021-04-02 DIAGNOSIS — F41.1 GAD (GENERALIZED ANXIETY DISORDER): ICD-10-CM

## 2021-04-02 RX ORDER — BUPROPION HYDROCHLORIDE 150 MG/1
150 TABLET ORAL EVERY MORNING
Qty: 30 TABLET | Refills: 0 | OUTPATIENT
Start: 2021-04-02

## 2021-06-22 DIAGNOSIS — F41.1 GAD (GENERALIZED ANXIETY DISORDER): ICD-10-CM

## 2021-06-22 DIAGNOSIS — G47.00 INSOMNIA, UNSPECIFIED TYPE: ICD-10-CM

## 2021-06-23 NOTE — TELEPHONE ENCOUNTER
Outpatient Medication Detail     Disp Refills Start End YUNIEL   FLUoxetine (PROZAC) 20 MG capsule (Discontinued) 90 capsule 0 6/22/2021 6/23/2021 No   Sig: TAKE 3 CAPSULES BY MOUTH EVERY DAY   Sent to pharmacy as: FLUoxetine HCl 20 MG Oral Capsule (PROzac)   Class: E-Prescribe   Notes to Pharmacy: DX Code Needed  PLEASE SEND NEW RX REFLECTING MORE THAN A QUANTITY OF 30 CAPSULES YOUR PATIENT IS TAKNG 3 CAPS / DAY NEEDS 90 CAPS FOR A MONTH SUPPLY. THANKS! [facility].   Order: [id number]   E-Prescribing Status: Transmission to pharmacy failed (6/22/2021  9:32 AM CDT)     RX re-sent.    Itzel Hickman RN

## 2021-06-25 DIAGNOSIS — F41.1 GAD (GENERALIZED ANXIETY DISORDER): ICD-10-CM

## 2021-06-25 NOTE — TELEPHONE ENCOUNTER
LMTCB- patient due for an appt with MAURO. Will forward to TC's to continue to assist with scheduling.   Jacinta Lorenzo RN on 6/25/2021 at 9:52 AM

## 2021-06-25 NOTE — LETTER
July 13, 2021      Mindi DUNCAN Skyler Bourne  [address]  [facility] [place] [facility] [address]        Dear ,    We recently received a call from your pharmacy requesting a refill of your medication buproprion. We are contacting you today to notify you that you are due for a medication check for further refills.     We have authorized a one time refill of your medication to allow time for you to schedule an appointment.     Please call [phone] to schedule an appointment or if you have MyChart you can schedule with your provider as well.     Taking care of your health is important to us, and ongoing visits with your provider are vital to your care. We look forward to seeing you in the near future.     Thank you for using [facility] for your medical needs.       Sincerely,      ALFRED Rosario Ra CNP

## 2021-07-21 RX ORDER — BUPROPION HYDROCHLORIDE 150 MG/1
TABLET ORAL
Qty: 30 TABLET | Refills: 0 | Status: SHIPPED | OUTPATIENT
Start: 2021-07-21 | End: 2021-07-27

## 2021-07-30 ENCOUNTER — VIRTUAL VISIT (OUTPATIENT)
Dept: FAMILY MEDICINE | Facility: CLINIC | Age: 21
End: 2021-07-30
Payer: COMMERCIAL

## 2021-07-30 DIAGNOSIS — Z11.3 SCREENING FOR STDS (SEXUALLY TRANSMITTED DISEASES): ICD-10-CM

## 2021-07-30 DIAGNOSIS — F41.1 GAD (GENERALIZED ANXIETY DISORDER): ICD-10-CM

## 2021-07-30 DIAGNOSIS — G47.00 INSOMNIA, UNSPECIFIED TYPE: ICD-10-CM

## 2021-07-30 DIAGNOSIS — Z12.4 SCREENING FOR MALIGNANT NEOPLASM OF CERVIX: ICD-10-CM

## 2021-07-30 PROCEDURE — 99213 OFFICE O/P EST LOW 20 MIN: CPT | Mod: 95 | Performed by: NURSE PRACTITIONER

## 2021-07-30 RX ORDER — BUPROPION HYDROCHLORIDE 150 MG/1
150 TABLET ORAL EVERY MORNING
Qty: 90 TABLET | Refills: 1 | Status: SHIPPED | OUTPATIENT
Start: 2021-07-30 | End: 2022-03-09

## 2021-07-30 ASSESSMENT — ANXIETY QUESTIONNAIRES
2. NOT BEING ABLE TO STOP OR CONTROL WORRYING: NOT AT ALL
3. WORRYING TOO MUCH ABOUT DIFFERENT THINGS: SEVERAL DAYS
GAD7 TOTAL SCORE: 3
5. BEING SO RESTLESS THAT IT IS HARD TO SIT STILL: NOT AT ALL
6. BECOMING EASILY ANNOYED OR IRRITABLE: SEVERAL DAYS
7. FEELING AFRAID AS IF SOMETHING AWFUL MIGHT HAPPEN: NOT AT ALL
IF YOU CHECKED OFF ANY PROBLEMS ON THIS QUESTIONNAIRE, HOW DIFFICULT HAVE THESE PROBLEMS MADE IT FOR YOU TO DO YOUR WORK, TAKE CARE OF THINGS AT HOME, OR GET ALONG WITH OTHER PEOPLE: SOMEWHAT DIFFICULT
1. FEELING NERVOUS, ANXIOUS, OR ON EDGE: SEVERAL DAYS

## 2021-07-30 ASSESSMENT — PATIENT HEALTH QUESTIONNAIRE - PHQ9
SUM OF ALL RESPONSES TO PHQ QUESTIONS 1-9: 6
5. POOR APPETITE OR OVEREATING: NOT AT ALL

## 2021-07-30 NOTE — PROGRESS NOTES
Mindi is a 21 year old who is being evaluated via a billable video visit.      How would you like to obtain your AVS? MyChart  If the video visit is dropped, the invitation should be resent by: Text to cell phone: [phone]  Will anyone else be joining your video visit? No    Video Start Time: 11:01am    Assessment & Plan     OLI (generalized anxiety disorder)  Asymptomatic.  Well controlled.  Continue with current plan.  - buPROPion (WELLBUTRIN XL) 150 MG 24 hr tablet; Take 1 tablet (150 mg) by mouth every morning  - FLUoxetine (PROZAC) 20 MG capsule; Take 3 capsules (60 mg) by mouth daily Needs appointment for further refills    Insomnia, unspecified type  Intermittent issues, desires no specific intervention.  Monitor.  - FLUoxetine (PROZAC) 20 MG capsule; Take 3 capsules (60 mg) by mouth daily Needs appointment for further refills    Screening for STDs (sexually transmitted diseases)  - NEISSERIA GONORRHOEA PCR; Future  - CHLAMYDIA TRACHOMATIS PCR; Future      HM  Pt is due for tdap.  Also would like covid vaccine.  Will have nurse schedule her for both.               No follow-ups on file.    ALFRED Rosario Ra CNP  [facility]    Subjective   Mindi is a 21 year old who presents for the following health issues     HPI     Anxiety Follow-Up    How are you doing with your anxiety since your last visit? Improved     Are you having other symptoms that might be associated with anxiety? No    Have you had a significant life event? No     Are you feeling depressed? No    Do you have any concerns with your use of alcohol or other drugs? No    Social History     Tobacco Use     Smoking status: Never Smoker     Smokeless tobacco: Never Used   Substance Use Topics     Alcohol use: Never     Drug use: Never     OLI-7 SCORE 11/6/2019 6/2/2020 7/30/2021   Total Score 2 2 3     PHQ 11/6/2019 6/2/2020 7/30/2021   PHQ-9 Total Score 6 3 6   Q9: Thoughts of better off dead/self-harm past 2 weeks  "Not at all Not at all Not at all           How many servings of fruits and vegetables do you eat daily?  2-3    On average, how many sweetened beverages do you drink each day (Examples: soda, juice, sweet tea, etc.  Do NOT count diet or artificially sweetened beverages)?   0    How many days per week do you exercise enough to make your heart beat faster? 3 or less    How many minutes a day do you exercise enough to make your heart beat faster? 10 - 19  How many days per week do you miss taking your medication? 1    What makes it hard for you to take your medications?  remembering to take    Pt has been doing well.  Compliant with medications.  Finds them effective.  No side effects.  She does still have intermittent sleep issues, typically due to outside stressors.  Always returns to baseline.  Does not feel further change is needed.    Review of Systems   Constitutional, HEENT, cardiovascular, pulmonary, gi and gu systems are negative, except as otherwise noted.      Objective    Vitals - Patient Reported  Weight (Patient Reported): 86.2 kg (190 lb)  Height (Patient Reported): 162.6 cm (5' 4\")  BMI (Based on Pt Reported Ht/Wt): 32.61  Pain Score: No Pain (0)      Vitals:  No vitals were obtained today due to virtual visit.    Physical Exam   GENERAL: Healthy, alert and no distress  EYES: Eyes grossly normal to inspection.  No discharge or erythema, or obvious scleral/conjunctival abnormalities.  RESP: No audible wheeze, cough, or visible cyanosis.  No visible retractions or increased work of breathing.    SKIN: Visible skin clear. No significant rash, abnormal pigmentation or lesions.  NEURO: Cranial nerves grossly intact.  Mentation and speech appropriate for age.  PSYCH: Mentation appears normal, affect normal/bright, judgement and insight intact, normal speech and appearance well-groomed.                Video-Visit Details    Type of service:  Video Visit    Video End Time:11:08 AM    Originating Location (pt. " Location): Home    Distant Location (provider location):  [facility] [employer]     Platform used for Video Visit: Ericka

## 2021-07-31 ASSESSMENT — ANXIETY QUESTIONNAIRES: GAD7 TOTAL SCORE: 3

## 2021-08-25 ENCOUNTER — PATIENT OUTREACH (OUTPATIENT)
Dept: FAMILY MEDICINE | Facility: CLINIC | Age: 21
End: 2021-08-25

## 2021-08-25 NOTE — TELEPHONE ENCOUNTER
Patient Quality Outreach      Summary:    Patient has the following on her problem list/HM: None    Patient is due/failing the following:   Cervical Cancer Screening - PAP Needed and Adult/Adolescent physical, date due: As soon as possible    Type of outreach:    Sent [employer] message.    Questions for provider review:    None                                                                                                                                     Rita Franz CMA     Chart routed to Care Team.

## 2021-08-25 NOTE — LETTER
[facility]  [address] [facility] [address] [phone]       October 7, 2021    Mindi Bourne  [address] [facility] [address]    Dear Mindi,    We care about your health and have reviewed your health plan and are making recommendations based on this review, to optimize your health.    You are in particular need of attention regarding:  -Cervical Cancer Screening  -Wellness (Physical) Visit     We are recommending that you:                                                                           -schedule a WELLNESS (Physical) APPOINTMENT. We will check fasting labs the same day. You should have nothing to eat except water and meds for 8-10 hours prior.                                                                                                                         -schedule a PAP SMEAR EXAM which is due.  Please disregard this reminder if you have had this exam elsewhere within the last year.  It would be helpful for us to have a copy of your recent pap smear report in our file so that we can best coordinate your care.    If you are under/uninsured, we recommend you contact the [facility]. They offer pap smears at no charge or on a sliding fee charge. You can schedule with them at [phone]. Please have them send us the results.                                                                                                In addition, here is a list of due or overdue Health Maintenance reminders.    Health Maintenance Due   Topic Date Due     Preventive Care Visit  Never done     Chlamydia Screening  Never done     Discuss Advance Care Planning  Never done     Hepatitis B Vaccine (2 of 3 - 3-dose primary series) 06/21/2000     PAP Smear  Never done     Diptheria Tetanus Pertussis (DTAP/TDAP/TD) Vaccine (4 - Td or Tdap) 09/20/2020     Flu Vaccine (1) Never done       To address the above recommendations, we encourage you to contact us at  [phone], via [employer] or by contacting Central Scheduling toll free at [phone] 24 hours a day. They will assist you with finding the most convenient time and location.    Thank you for trusting [facility] and we appreciate the opportunity to serve you.  We look forward to supporting your healthcare needs in the future.    Healthy Regards,    Your [facility] Team

## 2021-09-05 ENCOUNTER — HEALTH MAINTENANCE LETTER (OUTPATIENT)
Age: 21
End: 2021-09-05

## 2021-10-07 NOTE — TELEPHONE ENCOUNTER
Patient Quality Outreach 2nd Attempt      Summary:    Type of outreach:    Sent letter.    Next Steps:  Reach out within 90 days via Phone.    Max number of attempts reached: Yes. Will try again in 90 days if patient still on fail list.    Questions for provider review:    None                                                                                                                    Rita Franz CMA     Chart routed to Care Team.

## 2021-12-26 ENCOUNTER — HEALTH MAINTENANCE LETTER (OUTPATIENT)
Age: 21
End: 2021-12-26

## 2022-02-10 ENCOUNTER — TELEPHONE (OUTPATIENT)
Dept: FAMILY MEDICINE | Facility: CLINIC | Age: 22
End: 2022-02-10
Payer: COMMERCIAL

## 2022-02-10 NOTE — LETTER
[facility]  [address] [address] [phone]       Sussy 3, 2022    Mindi Bourne  [address] [address]    Dear Mindi,    We care about your health and have reviewed your health plan and are making recommendations based on this review, to optimize your health.    You are in particular need of attention regarding:  -Cervical Cancer Screening  -Wellness (Physical) Visit     We are recommending that you:  -schedule a WELLNESS (Physical) APPOINTMENT with me.   I will check fasting labs the same day - nothing to eat except water and meds for 8-10 hours prior.      -schedule a PAP SMEAR EXAM which is due.  Please disregard this reminder if you have had this exam elsewhere within the last year.  It would be helpful for us to have a copy of your recent pap smear report in our file so that we can best coordinate your care.    If you are under/uninsured, we recommend you contact the [facility]. They offer pap smears at no charge or on a sliding fee charge. You can schedule with them at [phone]. Please have them send us the results.      In addition, here is a list of due or overdue Health Maintenance reminders.    Health Maintenance Due   Topic Date Due     Preventive Care Visit  Never done     Chlamydia Screening  Never done     Discuss Advance Care Planning  Never done     Hepatitis B Vaccine (2 of 3 - 3-dose primary series) 06/21/2000     PAP Smear  Never done     Diptheria Tetanus Pertussis (DTAP/TDAP/TD) Vaccine (4 - Td or Tdap) 09/20/2020     PHQ-2 (once per calendar year)  01/01/2022     COVID-19 Vaccine (3 - Booster for Moderna series) 01/29/2022     Anxiety Assessment  01/30/2022       To address the above recommendations, we encourage you to contact us at [phone], via [employer] or by contacting Central Scheduling toll free at [phone] 24 hours a day. They will assist you with finding the most convenient time and  location.    Thank you for trusting [facility] and we appreciate the opportunity to serve you.  We look forward to supporting your healthcare needs in the future.    Healthy Regards,    Your [facility] Team

## 2022-02-10 NOTE — LETTER
[facility]  [address] [address] [phone]       February 28, 2022    Mindi Bourne  [address] [address]    Dear Mindi,    We care about your health and have reviewed your health plan and are making recommendations based on this review, to optimize your health.    You are in particular need of attention regarding:  -Cervical Cancer Screening  -Wellness (Physical) Visit   -Chlamydia Screening    We are recommending that you:  -schedule a WELLNESS (Physical) APPOINTMENT with me.   I will check fasting labs the same day - nothing to eat except water and meds for 8-10 hours prior.      -schedule a PAP SMEAR EXAM which is due.  Please disregard this reminder if you have had this exam elsewhere within the last year.  It would be helpful for us to have a copy of your recent pap smear report in our file so that we can best coordinate your care.    If you are under/uninsured, we recommend you contact the [facility]. They offer pap smears at no charge or on a sliding fee charge. You can schedule with them at [phone]. Please have them send us the results.    -Be tested for Chlamydia      Annual testing is recommended for sexually active women between the ages of 15 and 25.     Chlamydia is the most common bacterial sexually transmitted disease in the [place], according to the Centers for Disease Control (CDC), yet many women considered at risk for the disease do not get the recommended annual screening test.     Chlamydia has no symptoms and left untreated, it can cause infertility and other serious health problems. Chlamydia is easily cured with antibiotics.  We have enclosed a brochure that gives you additional information about Chlamydia.    Testing is now usually done by leaving a urine sample with a lab-only appointment or you can make an office visit if you have other concerns. (If you are not recently or currently  sexually active, then please contact us so we can update your medical record.)          In addition, here is a list of due or overdue Health Maintenance reminders.    Health Maintenance Due   Topic Date Due     Preventive Care Visit  Never done     Chlamydia Screening  Never done     Discuss Advance Care Planning  Never done     Hepatitis B Vaccine (2 of 3 - 3-dose primary series) 06/21/2000     PAP Smear  Never done     Diptheria Tetanus Pertussis (DTAP/TDAP/TD) Vaccine (4 - Td or Tdap) 09/20/2020     Flu Vaccine (1) Never done     PHQ-2  01/01/2022     COVID-19 Vaccine (3 - Booster for Moderna series) 01/29/2022     Anxiety Assessment  01/30/2022       To address the above recommendations, we encourage you to contact us at [phone], via [employer] or by contacting Central Scheduling toll free at [phone] 24 hours a day. They will assist you with finding the most convenient time and location.    Thank you for trusting [facility] and we appreciate the opportunity to serve you.  We look forward to supporting your healthcare needs in the future.    Healthy Regards,    Your [facility] Team

## 2022-02-10 NOTE — TELEPHONE ENCOUNTER
Patient Quality Outreach    Patient is due for the following:   Cervical Cancer Screening - PAP Needed  Physical  - Never Done   Chlamydia  Immunizations  -  Covid, Influenza and Tdap    NEXT STEPS:   Schedule a yearly physical    Type of outreach:    Sent [employer] message.      Questions for provider review:    None     Chandler Jay MA

## 2022-02-28 NOTE — TELEPHONE ENCOUNTER
Patient Quality Outreach    Patient is due for the following:   Cervical Cancer Screening - PAP Needed  Physical  - Never Done   Chlamydia  Immunizations  -  Covid, Influenza and Tdap    NEXT STEPS:   Schedule a yearly physical    Type of outreach:    Sent letter.    Next Steps:  Reach out within 90 days via Letter.    Max number of attempts reached: Yes. Will try again in 90 days if patient still on fail list.    Questions for provider review:    None     Chandler Jay MA

## 2022-04-18 DIAGNOSIS — G47.00 INSOMNIA, UNSPECIFIED TYPE: ICD-10-CM

## 2022-04-18 DIAGNOSIS — F41.1 GAD (GENERALIZED ANXIETY DISORDER): ICD-10-CM

## 2022-04-18 NOTE — LETTER
April 26, 2022      Mindi Bourne  [address] [place] [address]        Dear Ms. Mindi Bourne,    We recently received a call from your pharmacy requesting a refill of your medication Fluoxetine.    We are contacting you today to notify you that you are due for a medication check for further refills.    We have authorized one refill of your medication to allow time for you to schedule your appointment.      Please call [phone] to schedule an appointment or if you have MyChart you can schedule with your provider as well.    Taking care of your health is important to us, an ongoing visits with your provider are vital to your care. We look forward to seeing you in the near future.    Thank you for using [facility] for your Medical Needs.          Sincerely,     Kassi Montiel, MSN, FNP-BC

## 2022-04-19 NOTE — TELEPHONE ENCOUNTER
Routing refill request to provider for review/approval because:  Labs out of range:  PHQ-9    PHQ 11/6/2019 6/2/2020 7/30/2021   PHQ-9 Total Score 6 3 6   Q9: Thoughts of better off dead/self-harm past 2 weeks Not at all Not at all Not at all     Anayeli Ardon, RN

## 2022-06-03 NOTE — TELEPHONE ENCOUNTER
Patient Quality Outreach    Patient is due for the following:   Cervical Cancer Screening - PAP Needed  Physical  - Never Done  Immunizations  -  Covid and Tdap    NEXT STEPS:   Schedule a yearly physical    Type of outreach:    Sent letter.      Questions for provider review:    None     Chandler Jay MA

## 2022-06-24 ENCOUNTER — VIRTUAL VISIT (OUTPATIENT)
Dept: FAMILY MEDICINE | Facility: CLINIC | Age: 22
End: 2022-06-24

## 2022-06-24 DIAGNOSIS — F41.1 GAD (GENERALIZED ANXIETY DISORDER): ICD-10-CM

## 2022-06-24 DIAGNOSIS — G47.00 INSOMNIA, UNSPECIFIED TYPE: ICD-10-CM

## 2022-06-24 PROCEDURE — 99213 OFFICE O/P EST LOW 20 MIN: CPT | Mod: 95 | Performed by: NURSE PRACTITIONER

## 2022-06-24 RX ORDER — BUPROPION HYDROCHLORIDE 150 MG/1
150 TABLET ORAL EVERY MORNING
Qty: 90 TABLET | Refills: 3 | Status: SHIPPED | OUTPATIENT
Start: 2022-06-24 | End: 2023-12-18

## 2022-06-24 ASSESSMENT — ANXIETY QUESTIONNAIRES
5. BEING SO RESTLESS THAT IT IS HARD TO SIT STILL: NOT AT ALL
GAD7 TOTAL SCORE: 3
1. FEELING NERVOUS, ANXIOUS, OR ON EDGE: SEVERAL DAYS
GAD7 TOTAL SCORE: 3
4. TROUBLE RELAXING: NOT AT ALL
2. NOT BEING ABLE TO STOP OR CONTROL WORRYING: NOT AT ALL
8. IF YOU CHECKED OFF ANY PROBLEMS, HOW DIFFICULT HAVE THESE MADE IT FOR YOU TO DO YOUR WORK, TAKE CARE OF THINGS AT HOME, OR GET ALONG WITH OTHER PEOPLE?: NOT DIFFICULT AT ALL
6. BECOMING EASILY ANNOYED OR IRRITABLE: SEVERAL DAYS
3. WORRYING TOO MUCH ABOUT DIFFERENT THINGS: SEVERAL DAYS
7. FEELING AFRAID AS IF SOMETHING AWFUL MIGHT HAPPEN: NOT AT ALL
7. FEELING AFRAID AS IF SOMETHING AWFUL MIGHT HAPPEN: NOT AT ALL
GAD7 TOTAL SCORE: 3

## 2022-06-24 NOTE — PROGRESS NOTES
"Mindi is a 22 year old who is being evaluated via a billable video visit.      How would you like to obtain your AVS? {AVS Preference:086420}  If the video visit is dropped, the invitation should be resent by: {video visit invitation (Optional) :280351}  Will anyone else be joining your video visit? {:057352}  {If patient encounters technical issues they should call [phone] :091872}        {PROVIDER CHARTING PREFERENCE:381328}    Subjective   Mindi is a 22 year old, presenting for the following health issues:  Recheck Medication      HPI     Anxiety Follow-Up    How are you doing with your anxiety since your last visit? { :148479::\"No change\"}    Are you having other symptoms that might be associated with anxiety? { :413752}    Have you had a significant life event? { :078818}     Are you feeling depressed? { :307096}    Do you have any concerns with your use of alcohol or other drugs? { :295653}    Social History     Tobacco Use     Smoking status: Never Smoker     Smokeless tobacco: Never Used   Vaping Use     Vaping Use: Never used   Substance Use Topics     Alcohol use: Never     Drug use: Never     OLI-7 SCORE 11/6/2019 6/2/2020 7/30/2021   Total Score 2 2 3     PHQ 11/6/2019 6/2/2020 7/30/2021   PHQ-9 Total Score 6 3 6   Q9: Thoughts of better off dead/self-harm past 2 weeks Not at all Not at all Not at all     {Last PHQ9 or GAD7 Responses (Optional):210803}      How many servings of fruits and vegetables do you eat daily?  { :167798}    On average, how many sweetened beverages do you drink each day (Examples: soda, juice, sweet tea, etc.  Do NOT count diet or artificially sweetened beverages)?   { 1-11:864111}    How many days per week do you exercise enough to make your heart beat faster? { :812145}    How many minutes a day do you exercise enough to make your heart beat faster? { :213635}    How many days per week do you miss taking your medication? {0-7 :613102}    {additonal problems for provider to " "add (Optional):190487}    Review of Systems   {ROS COMP (Optional):641649}      Objective           Vitals:  No vitals were obtained today due to virtual visit.    Physical Exam   {video visit exam brief selected:714391::\"GENERAL: Healthy, alert and no distress\",\"EYES: Eyes grossly normal to inspection.  No discharge or erythema, or obvious scleral/conjunctival abnormalities.\",\"RESP: No audible wheeze, cough, or visible cyanosis.  No visible retractions or increased work of breathing.  \",\"SKIN: Visible skin clear. No significant rash, abnormal pigmentation or lesions.\",\"NEURO: Cranial nerves grossly intact.  Mentation and speech appropriate for age.\",\"PSYCH: Mentation appears normal, affect normal/bright, judgement and insight intact, normal speech and appearance well-groomed.\"}    {Diagnostic Test Results (Optional):841370}    {AMBULATORY ATTESTATION (Optional):353492}        Video-Visit Details    Video Start Time: {video visit start/end time for provider to select:782598}    Type of service:  Video Visit    Video End Time:{video visit start/end time for provider to select:586806}    Originating Location (pt. Location): {video visit patient location:074460::\"Home\"}    Distant Location (provider location):  [facility]     Platform used for Video Visit: {Virtual Visit Platforms:641851::\"AmWell\"}    .  ..  "

## 2022-06-24 NOTE — PROGRESS NOTES
Mindi is a 22 year old who is being evaluated via a billable video visit.      How would you like to obtain your AVS? MyChart  If the video visit is dropped, the invitation should be resent by: Text to cell phone: [phone]  Will anyone else be joining your video visit? No        Assessment & Plan     OLI (generalized anxiety disorder)  Stable, some trouble remembering to take med but no change desired.  Monitor.  Refilled.  - buPROPion (WELLBUTRIN XL) 150 MG 24 hr tablet; Take 1 tablet (150 mg) by mouth every morning  - FLUoxetine (PROZAC) 20 MG capsule; Take 3 capsules (60 mg) by mouth daily Needs appointment for further refills    Insomnia, unspecified type  - FLUoxetine (PROZAC) 20 MG capsule; Take 3 capsules (60 mg) by mouth daily Needs appointment for further refills           Return in about 3 months (around 9/24/2022) for Physical Exam.    ALFRED Rosario Ra [facility]    Alexys Obregon is a 22 year old, presenting for the following health issues:  Recheck Medication      History of Present Illness       Mental Health Follow-up:  Patient presents to follow-up on Anxiety.    Patient's anxiety since last visit has been:  Better  The patient is having other symptoms associated with anxiety.  Any significant life events: job concerns and grief or loss  Patient is not feeling anxious or having panic attacks.  Patient has no concerns about alcohol or drug use.    She eats 0-1 servings of fruits and vegetables daily.She consumes 2 sweetened beverage(s) daily.She exercises with enough effort to increase her heart rate 60 or more minutes per day.  She exercises with enough effort to increase her heart rate 4 days per week. She is missing 4 dose(s) of medications per week.  She is not taking prescribed medications regularly due to remembering to take.  Today's OLI-7 Score: 3         Doing well.  New job at [employer].  Meds are helpful, no side effects.  Last few weeks has been  forgetting to take meds consistently.    Review of Systems   Constitutional, HEENT, cardiovascular, pulmonary, gi and gu systems are negative, except as otherwise noted.      Objective           Vitals:  No vitals were obtained today due to virtual visit.    Physical Exam   GENERAL: Healthy, alert and no distress  EYES: Eyes grossly normal to inspection.  No discharge or erythema, or obvious scleral/conjunctival abnormalities.  RESP: No audible wheeze, cough, or visible cyanosis.  No visible retractions or increased work of breathing.    SKIN: Visible skin clear. No significant rash, abnormal pigmentation or lesions.  NEURO: Cranial nerves grossly intact.  Mentation and speech appropriate for age.  PSYCH: Mentation appears normal, affect normal/bright, judgement and insight intact, normal speech and appearance well-groomed.                Video-Visit Details    Video Start Time: 3:41 PM    Type of service:  Video Visit    Video End Time:3:44 PM    Originating Location (pt. Location): Home    Distant Location (provider location):  [facility] [employer]     Platform used for Video Visit: JasonWell    Johnnie Blair.

## 2022-07-19 ENCOUNTER — MYC MEDICAL ADVICE (OUTPATIENT)
Dept: FAMILY MEDICINE | Facility: CLINIC | Age: 22
End: 2022-07-19

## 2022-07-19 DIAGNOSIS — F41.1 GAD (GENERALIZED ANXIETY DISORDER): ICD-10-CM

## 2022-07-19 RX ORDER — HYDROXYZINE PAMOATE 25 MG/1
25-50 CAPSULE ORAL DAILY PRN
Qty: 30 CAPSULE | Refills: 0 | Status: SHIPPED | OUTPATIENT
Start: 2022-07-19 | End: 2022-08-02

## 2022-07-29 DIAGNOSIS — F41.1 GAD (GENERALIZED ANXIETY DISORDER): ICD-10-CM

## 2022-08-02 RX ORDER — HYDROXYZINE PAMOATE 25 MG/1
25-50 CAPSULE ORAL DAILY PRN
Qty: 180 CAPSULE | Refills: 0 | Status: SHIPPED | OUTPATIENT
Start: 2022-08-02 | End: 2023-12-18

## 2022-08-02 NOTE — TELEPHONE ENCOUNTER
Is it ok for pt to get 90 day refill for hydroxyzine?      Will forward to station, pt due for physical around 9/24/22.

## 2022-10-23 ENCOUNTER — HEALTH MAINTENANCE LETTER (OUTPATIENT)
Age: 22
End: 2022-10-23

## 2023-04-02 ENCOUNTER — HEALTH MAINTENANCE LETTER (OUTPATIENT)
Age: 23
End: 2023-04-02

## 2023-12-18 ENCOUNTER — MYC REFILL (OUTPATIENT)
Dept: FAMILY MEDICINE | Facility: CLINIC | Age: 23
End: 2023-12-18

## 2023-12-18 DIAGNOSIS — G47.00 INSOMNIA, UNSPECIFIED TYPE: ICD-10-CM

## 2023-12-18 DIAGNOSIS — F41.1 GAD (GENERALIZED ANXIETY DISORDER): ICD-10-CM

## 2023-12-18 RX ORDER — BUPROPION HYDROCHLORIDE 150 MG/1
150 TABLET ORAL EVERY MORNING
Qty: 90 TABLET | Refills: 0 | Status: SHIPPED | OUTPATIENT
Start: 2023-12-18 | End: 2024-02-08

## 2023-12-18 NOTE — LETTER
January 2, 2024      Mindi DUNCAN Skyler Bourne  [address] [place] [address]        Shanice Obregon    We recently received a call from your pharmacy requesting a refill request for your medications. We are contacting you today to notify you that you are due for an OFFICE VISIT for further refills.     We have authorized a one time refill of your medication to allow time for you to schedule your appointment.     Please call [phone] to schedule an appointment or if you have MyChart you can schedule with your provider as well.     Taking care of your health is important to us, and ongoing visits with your provider are vital to your care. We look forward to seeing you in the near future.     Thank you for using [facility] for your medical needs.     Sincerely,        ALFRED Rosario Ra CNP

## 2023-12-18 NOTE — TELEPHONE ENCOUNTER
Sent Unruly Â® message requesting a call back for an appt. Two more attempts will be made.    Amy Cheung  Lead

## 2023-12-19 RX ORDER — HYDROXYZINE PAMOATE 25 MG/1
25-50 CAPSULE ORAL DAILY PRN
Qty: 30 CAPSULE | Refills: 0 | Status: SHIPPED | OUTPATIENT
Start: 2023-12-19

## 2024-01-19 ENCOUNTER — IMMUNIZATION (OUTPATIENT)
Dept: FAMILY MEDICINE | Facility: CLINIC | Age: 24
End: 2024-01-19
Payer: COMMERCIAL

## 2024-01-19 DIAGNOSIS — Z23 NEED FOR VACCINATION: Primary | ICD-10-CM

## 2024-01-19 PROCEDURE — 99207 PR NO CHARGE NURSE ONLY: CPT

## 2024-01-19 PROCEDURE — 91320 SARSCV2 VAC 30MCG TRS-SUC IM: CPT

## 2024-01-19 PROCEDURE — 90480 ADMN SARSCOV2 VAC 1/ONLY CMP: CPT

## 2024-01-19 PROCEDURE — 90686 IIV4 VACC NO PRSV 0.5 ML IM: CPT

## 2024-01-19 PROCEDURE — 90715 TDAP VACCINE 7 YRS/> IM: CPT

## 2024-01-19 PROCEDURE — 90471 IMMUNIZATION ADMIN: CPT

## 2024-01-19 PROCEDURE — 90472 IMMUNIZATION ADMIN EACH ADD: CPT

## 2024-01-21 ENCOUNTER — E-VISIT (OUTPATIENT)
Dept: FAMILY MEDICINE | Facility: CLINIC | Age: 24
End: 2024-01-21
Payer: COMMERCIAL

## 2024-01-21 DIAGNOSIS — R53.83 FATIGUE, UNSPECIFIED TYPE: Primary | ICD-10-CM

## 2024-01-21 PROCEDURE — 99421 OL DIG E/M SVC 5-10 MIN: CPT | Performed by: NURSE PRACTITIONER

## 2024-01-21 NOTE — LETTER
January 23, 2024      Mindi Bourne  [address]  [facility] [address]        To Whom It May Concern:    Mindi Bourne  was seen on 1/23/24.  Please excuse her 1/21/24 due to illness.        Sincerely,        ALFRED Rosario Ra CNP

## 2024-02-08 ENCOUNTER — OFFICE VISIT (OUTPATIENT)
Dept: FAMILY MEDICINE | Facility: CLINIC | Age: 24
End: 2024-02-08
Payer: COMMERCIAL

## 2024-02-08 VITALS
HEIGHT: 65 IN | WEIGHT: 180 LBS | OXYGEN SATURATION: 97 % | RESPIRATION RATE: 16 BRPM | DIASTOLIC BLOOD PRESSURE: 60 MMHG | BODY MASS INDEX: 29.99 KG/M2 | SYSTOLIC BLOOD PRESSURE: 100 MMHG | HEART RATE: 82 BPM | TEMPERATURE: 98.4 F

## 2024-02-08 DIAGNOSIS — Z11.3 SCREENING FOR STDS (SEXUALLY TRANSMITTED DISEASES): Primary | ICD-10-CM

## 2024-02-08 DIAGNOSIS — F41.1 GAD (GENERALIZED ANXIETY DISORDER): ICD-10-CM

## 2024-02-08 DIAGNOSIS — M21.611 BILATERAL BUNIONS: ICD-10-CM

## 2024-02-08 DIAGNOSIS — N75.0 BARTHOLIN GLAND CYST: ICD-10-CM

## 2024-02-08 DIAGNOSIS — Z12.4 CERVICAL CANCER SCREENING: ICD-10-CM

## 2024-02-08 DIAGNOSIS — Z00.00 ROUTINE GENERAL MEDICAL EXAMINATION AT A HEALTH CARE FACILITY: ICD-10-CM

## 2024-02-08 DIAGNOSIS — M21.612 BILATERAL BUNIONS: ICD-10-CM

## 2024-02-08 DIAGNOSIS — G47.00 INSOMNIA, UNSPECIFIED TYPE: ICD-10-CM

## 2024-02-08 DIAGNOSIS — Z30.09 ENCOUNTER FOR OTHER GENERAL COUNSELING OR ADVICE ON CONTRACEPTION: ICD-10-CM

## 2024-02-08 PROCEDURE — 99214 OFFICE O/P EST MOD 30 MIN: CPT | Mod: 25 | Performed by: NURSE PRACTITIONER

## 2024-02-08 PROCEDURE — 99395 PREV VISIT EST AGE 18-39: CPT | Mod: 25 | Performed by: NURSE PRACTITIONER

## 2024-02-08 PROCEDURE — 90746 HEPB VACCINE 3 DOSE ADULT IM: CPT | Performed by: NURSE PRACTITIONER

## 2024-02-08 PROCEDURE — 87491 CHLMYD TRACH DNA AMP PROBE: CPT | Performed by: NURSE PRACTITIONER

## 2024-02-08 PROCEDURE — 87591 N.GONORRHOEAE DNA AMP PROB: CPT | Performed by: NURSE PRACTITIONER

## 2024-02-08 PROCEDURE — G0145 SCR C/V CYTO,THINLAYER,RESCR: HCPCS | Performed by: NURSE PRACTITIONER

## 2024-02-08 PROCEDURE — 90471 IMMUNIZATION ADMIN: CPT | Performed by: NURSE PRACTITIONER

## 2024-02-08 RX ORDER — BUPROPION HYDROCHLORIDE 150 MG/1
150 TABLET ORAL EVERY MORNING
Qty: 90 TABLET | Refills: 1 | Status: SHIPPED | OUTPATIENT
Start: 2024-02-08 | End: 2024-07-13

## 2024-02-08 SDOH — HEALTH STABILITY: PHYSICAL HEALTH: ON AVERAGE, HOW MANY DAYS PER WEEK DO YOU ENGAGE IN MODERATE TO STRENUOUS EXERCISE (LIKE A BRISK WALK)?: 7 DAYS

## 2024-02-08 ASSESSMENT — ANXIETY QUESTIONNAIRES
7. FEELING AFRAID AS IF SOMETHING AWFUL MIGHT HAPPEN: NOT AT ALL
3. WORRYING TOO MUCH ABOUT DIFFERENT THINGS: SEVERAL DAYS
GAD7 TOTAL SCORE: 5
2. NOT BEING ABLE TO STOP OR CONTROL WORRYING: SEVERAL DAYS
8. IF YOU CHECKED OFF ANY PROBLEMS, HOW DIFFICULT HAVE THESE MADE IT FOR YOU TO DO YOUR WORK, TAKE CARE OF THINGS AT HOME, OR GET ALONG WITH OTHER PEOPLE?: NOT DIFFICULT AT ALL
4. TROUBLE RELAXING: NOT AT ALL
1. FEELING NERVOUS, ANXIOUS, OR ON EDGE: SEVERAL DAYS
GAD7 TOTAL SCORE: 5
6. BECOMING EASILY ANNOYED OR IRRITABLE: SEVERAL DAYS
7. FEELING AFRAID AS IF SOMETHING AWFUL MIGHT HAPPEN: NOT AT ALL
IF YOU CHECKED OFF ANY PROBLEMS ON THIS QUESTIONNAIRE, HOW DIFFICULT HAVE THESE PROBLEMS MADE IT FOR YOU TO DO YOUR WORK, TAKE CARE OF THINGS AT HOME, OR GET ALONG WITH OTHER PEOPLE: NOT DIFFICULT AT ALL
5. BEING SO RESTLESS THAT IT IS HARD TO SIT STILL: SEVERAL DAYS

## 2024-02-08 ASSESSMENT — PAIN SCALES - GENERAL: PAINLEVEL: NO PAIN (0)

## 2024-02-08 ASSESSMENT — SOCIAL DETERMINANTS OF HEALTH (SDOH): HOW OFTEN DO YOU GET TOGETHER WITH FRIENDS OR RELATIVES?: THREE TIMES A WEEK

## 2024-02-08 NOTE — PROGRESS NOTES
"Preventive Care Visit  [facility] NILDAMOALFRED Aguiar Ra CNP, Family Practice  Feb 8, 2024    Assessment & Plan     Screening for STDs (sexually transmitted diseases)  - Chlamydia trachomatis/Neisseria gonorrhoeae by PCR- VAGINAL SELF-SWAB    Cervical cancer screening  - Pap Screen only - recommended age 21 - 24 years    OLI (generalized anxiety disorder)  Stable.  Refilled.  Recheck in 6 months.  - buPROPion (WELLBUTRIN XL) 150 MG 24 hr tablet; Take 1 tablet (150 mg) by mouth every morning  - FLUoxetine (PROZAC) 20 MG capsule; Take 3 capsules (60 mg) by mouth daily Needs appointment for further refills  - OFFICE/OUTPT VISIT,EST,LEVL IV    Insomnia, unspecified type  Stable.  - FLUoxetine (PROZAC) 20 MG capsule; Take 3 capsules (60 mg) by mouth daily Needs appointment for further refills  - OFFICE/OUTPT VISIT,EST,LEVL IV    Bilateral bunions  - Orthopedic  Referral; Future  - OFFICE/OUTPT VISIT,EST,LEVL IV    Bartholin gland cyst  - Ob/Gyn  Referral; Future  - OFFICE/OUTPT VISIT,EST,LEVL IV    Routine general medical examination at a health care facility    Contraception  Discussed options.  IUD vs. Nexplanon.  She is going to research but will schedule for mirena insertion.  Reviewed r/b/se.          BMI  Estimated body mass index is 30.19 kg/m  as calculated from the following:    Height as of this encounter: 1.645 m (5' 4.75\").    Weight as of this encounter: 81.6 kg (180 lb).       Counseling  Appropriate preventive services were discussed with this patient, including applicable screening as appropriate for fall prevention, nutrition, physical activity, Tobacco-use cessation, weight loss and cognition.  Checklist reviewing preventive services available has been given to the patient.  Reviewed patient's diet, addressing concerns and/or questions.   The patient reports drinking more than one alcoholic drink per day and sometimes engages in binge or excessive drinking. " The patient was counseled and given information about possible harmful effects of excessive alcohol intake as well as where to get help for alcohol problems.           Alexys Obregon is a 23 year old, presenting for the following:  Physical        2/8/2024     9:46 AM   Additional Questions   Roomed by reny moya cma   Accompanied by self         2/8/2024     9:46 AM   Patient Reported Additional Medications   Patient reports taking the following new medications na        Health Care Directive  Patient does not have a Health Care Directive or Living Will: Discussed advance care planning with patient; however, patient declined at this time.    HPI      Mood has been stable.  Taking meds consistently for the past 1 month.  Finds them very helpful.  No side effects.    Concerned about recurrent issues with bartholin cyst over the past few years.  Has not required I & D, but does become painful.  She utilizes warm soaks to help resolve.  Concerned as it continues to recur.    Interested in contraception.  Not currently sexually active.  She does smoke.  She has periods monthly.          2/8/2024   General Health   How would you rate your overall physical health? Good   Feel stress (tense, anxious, or unable to sleep) To some extent   (!) STRESS CONCERN      2/8/2024   Nutrition   Three or more servings of calcium each day? Yes   Diet: Low salt    Low fat/cholesterol   How many servings of fruit and vegetables per day? 4 or more   How many sweetened beverages each day? 0-1         2/8/2024   Exercise   Days per week of moderate/strenous exercise 7 days         2/8/2024   Social Factors   Frequency of gathering with friends or relatives Three times a week   Worry food won't last until get money to buy more No   Food not last or not have enough money for food? No   Do you have housing?  Yes   Are you worried about losing your housing? Patient declined   Lack of transportation? Yes   Unable to get utilities  (heat,electricity)? No    (!) TRANSPORTATION CONCERN PRESENT      2/8/2024   Dental   Dentist two times every year? Yes         2/8/2024   TB Screening   Were you born outside of [place]?  No         Today's PHQ-2 Score:       2/8/2024     9:41 AM   PHQ-2 ( 1999 [employer])   Q1: Little interest or pleasure in doing things 1   Q2: Feeling down, depressed or hopeless 0   PHQ-2 Score 1   Q1: Little interest or pleasure in doing things Several days   Q2: Feeling down, depressed or hopeless Not at all   PHQ-2 Score 1           2/8/2024   Substance Use   Alcohol more than 3/day or more than 7/wk Yes   How often do you have a drink containing alcohol 2 to 4 times a month   How many alcohol drinks on typical day 1 or 2   How often do you have 5+ drinks at one occasion Never   Audit 2/3 Score 0   How often not able to stop drinking once started Never   How often failed to do what normally expected Never   How often needed first drink in am after a heavy drinking session Never   How often feeling of guilt or remorse after drinking Never   How often unable to remember what happened the night before Never   Have you or someone else been injured because of your drinking No   Has anyone been concerned or suggested you cut down on drinking No   TOTAL SCORE - AUDIT 2   Do you use any other substances recreationally? (!) CANNABIS PRODUCTS     Social History     Tobacco Use    Smoking status: Never    Smokeless tobacco: Never   Vaping Use    Vaping Use: Never used   Substance Use Topics    Alcohol use: Never    Drug use: Never             2/8/2024   One time HIV Screening   Previous HIV test? No         2/8/2024   STI Screening   New sexual partner(s) since last STI/HIV test? (!) DECLINE     History of abnormal Pap smear: No, never had a pap.             2/8/2024   Contraception/Family Planning   Questions about contraception or family planning (!) YES         Reviewed and updated as needed this visit by Provider                    Past  "Medical History:   Diagnosis Date    Depression     Depression     Depressive disorder      Review of Systems    Review of Systems  Constitutional, HEENT, cardiovascular, pulmonary, gi and gu systems are negative, except as otherwise noted.     Objective    Exam  /60   Pulse 82   Temp 98.4  F (36.9  C) (Oral)   Resp 16   Ht 1.645 m (5' 4.75\")   Wt 81.6 kg (180 lb)   LMP 01/08/2024 (Approximate)   SpO2 97%   BMI 30.19 kg/m     Estimated body mass index is 30.19 kg/m  as calculated from the following:    Height as of this encounter: 1.645 m (5' 4.75\").    Weight as of this encounter: 81.6 kg (180 lb).    Physical Exam  GENERAL: alert and no distress  EYES: Eyes grossly normal to inspection, PERRL and conjunctivae and sclerae normal  HENT: ear canals and TM's normal, nose and mouth without ulcers or lesions  NECK: no adenopathy, no asymmetry, masses, or scars  RESP: lungs clear to auscultation - no rales, rhonchi or wheezes  CV: regular rate and rhythm, normal S1 S2, no S3 or S4, no murmur, click or rub, no peripheral edema  ABDOMEN: soft, nontender, no hepatosplenomegaly, no masses and bowel sounds normal   (female): normal female external genitalia, normal urethral meatus, normal vaginal mucosa  SKIN: no suspicious lesions or rashes  NEURO: Normal strength and tone, mentation intact and speech normal  PSYCH: mentation appears normal, affect normal/bright      Signed Electronically by: ALFRED Rosario Ra CNP    Answers submitted by the patient for this visit:  OLI-7 (Submitted on 2/8/2024)  OLI 7 TOTAL SCORE: 5    "

## 2024-02-09 LAB
C TRACH DNA SPEC QL PROBE+SIG AMP: NEGATIVE
N GONORRHOEA DNA SPEC QL NAA+PROBE: NEGATIVE

## 2024-02-12 LAB
BKR LAB AP GYN ADEQUACY: NORMAL
BKR LAB AP GYN INTERPRETATION: NORMAL
BKR LAB AP HPV REFLEX: NO
BKR LAB AP PREVIOUS ABNORMAL: NORMAL
PATH REPORT.COMMENTS IMP SPEC: NORMAL
PATH REPORT.COMMENTS IMP SPEC: NORMAL
PATH REPORT.RELEVANT HX SPEC: NORMAL

## 2024-02-22 ENCOUNTER — OFFICE VISIT (OUTPATIENT)
Dept: FAMILY MEDICINE | Facility: CLINIC | Age: 24
End: 2024-02-22
Payer: COMMERCIAL

## 2024-02-22 VITALS
SYSTOLIC BLOOD PRESSURE: 113 MMHG | OXYGEN SATURATION: 97 % | RESPIRATION RATE: 16 BRPM | DIASTOLIC BLOOD PRESSURE: 72 MMHG | HEART RATE: 71 BPM

## 2024-02-22 DIAGNOSIS — M54.9 UPPER BACK PAIN: ICD-10-CM

## 2024-02-22 DIAGNOSIS — Z30.430 ENCOUNTER FOR INSERTION OF INTRAUTERINE CONTRACEPTIVE DEVICE: ICD-10-CM

## 2024-02-22 DIAGNOSIS — G89.29 CHRONIC PAIN OF BOTH SHOULDERS: ICD-10-CM

## 2024-02-22 DIAGNOSIS — Z30.430 ENCOUNTER FOR IUD INSERTION: Primary | ICD-10-CM

## 2024-02-22 DIAGNOSIS — M25.511 CHRONIC PAIN OF BOTH SHOULDERS: ICD-10-CM

## 2024-02-22 DIAGNOSIS — M25.512 CHRONIC PAIN OF BOTH SHOULDERS: ICD-10-CM

## 2024-02-22 LAB — HCG UR QL: NEGATIVE

## 2024-02-22 PROCEDURE — 81025 URINE PREGNANCY TEST: CPT | Performed by: NURSE PRACTITIONER

## 2024-02-22 PROCEDURE — 58300 INSERT INTRAUTERINE DEVICE: CPT | Performed by: NURSE PRACTITIONER

## 2024-02-22 ASSESSMENT — PATIENT HEALTH QUESTIONNAIRE - PHQ9
10. IF YOU CHECKED OFF ANY PROBLEMS, HOW DIFFICULT HAVE THESE PROBLEMS MADE IT FOR YOU TO DO YOUR WORK, TAKE CARE OF THINGS AT HOME, OR GET ALONG WITH OTHER PEOPLE: NOT DIFFICULT AT ALL
SUM OF ALL RESPONSES TO PHQ QUESTIONS 1-9: 6
SUM OF ALL RESPONSES TO PHQ QUESTIONS 1-9: 6

## 2024-02-22 ASSESSMENT — ANXIETY QUESTIONNAIRES
6. BECOMING EASILY ANNOYED OR IRRITABLE: SEVERAL DAYS
7. FEELING AFRAID AS IF SOMETHING AWFUL MIGHT HAPPEN: NOT AT ALL
5. BEING SO RESTLESS THAT IT IS HARD TO SIT STILL: SEVERAL DAYS
2. NOT BEING ABLE TO STOP OR CONTROL WORRYING: SEVERAL DAYS
4. TROUBLE RELAXING: NOT AT ALL
7. FEELING AFRAID AS IF SOMETHING AWFUL MIGHT HAPPEN: NOT AT ALL
IF YOU CHECKED OFF ANY PROBLEMS ON THIS QUESTIONNAIRE, HOW DIFFICULT HAVE THESE PROBLEMS MADE IT FOR YOU TO DO YOUR WORK, TAKE CARE OF THINGS AT HOME, OR GET ALONG WITH OTHER PEOPLE: NOT DIFFICULT AT ALL
8. IF YOU CHECKED OFF ANY PROBLEMS, HOW DIFFICULT HAVE THESE MADE IT FOR YOU TO DO YOUR WORK, TAKE CARE OF THINGS AT HOME, OR GET ALONG WITH OTHER PEOPLE?: NOT DIFFICULT AT ALL
1. FEELING NERVOUS, ANXIOUS, OR ON EDGE: SEVERAL DAYS
3. WORRYING TOO MUCH ABOUT DIFFERENT THINGS: SEVERAL DAYS
GAD7 TOTAL SCORE: 5

## 2024-02-22 NOTE — PROGRESS NOTES
IUD Insertion:  CONSULT:    Is a pregnancy test required: {Pregnancy test required:807469}  Was a consent obtained?  {Yes/No:821362}    Subjective: Mindi Bourne is a 23 year old No obstetric history on file. presents for IUD and desires {OB IUD TYPE:042980} type IUD.    Patient has been given the opportunity to ask questions about all forms of birth control, including all options appropriate for Mindi Bourne. Discussed that no method of birth control, except abstinence is 100% effective against pregnancy or sexually transmitted infection.     Mindi Bourne understands she may have the IUD removed at any time. IUD should be removed by a health care provider.    The entire insertion procedure was reviewed with the patient, including care after placement.    Patient's last menstrual period was 2024 (approximate). {last sex (Optional):450011}. No allergy to betadine or shellfish. {std screenin}  HCG Qual Urine   Date Value Ref Range Status   2013 Negative NEG Final         LMP 2024 (Approximate)     Pelvic Exam:   EG/BUS: normal genital architecture without lesions, erythema or abnormal secretions.   Vagina: moist, pink, rugae with physiologic discharge and secretions  Cervix: ***parous no lesions and pink, moist, closed, without lesion or CMT  Uterus: ***position, mobile, no pain  Adnexa: within normal limits and no masses, nodularity, tenderness    PROCEDURE NOTE: -- IUD Insertion    Reason for Insertion: {IUD reasons:536934}    {IUD pain:490392}  Under sterile technique, cervix was visualized with speculum and prepped with {cleansing agents:461706} solution swab x 3. {IUD instruments:944971} was placed for stability. The uterus was gently straightened and sounded to {IUD SOUNDING DEPTHS:725260} cm. IUD prepared for placement, and IUD inserted according to 's instructions without difficulty or significant resitance, and deployed at the fundus. The strings were  visualized and trimmed to {IUD SOUNDING DEPTHS:613859} cm from the external os. Tenaculum was removed and hemostasis noted. Speculum removed.  Patient tolerated procedure well.    Lot # ***  Exp: ***    EBL: minimal    Complications: none    ASSESSMENT: No diagnosis found.     PLAN:    Given 's handouts, including when to have IUD removed, list of danger s/sx, side effects and follow up recommended. Encouraged condom use for prevention of STD. Back up contraception advised for 7 days if progestin method. Advised to call for any fever, for prolonged or severe pain or bleeding, abnormal vaginal discharge, or unable to palpate strings. She was advised to use pain medications (ibuprofen) as needed for mild to moderate pain. Advised to follow-up in clinic in 4-6 weeks for IUD string check if unable to find strings or as directed by provider.     ALFRED Rosario Ra CNP

## 2024-02-22 NOTE — PROGRESS NOTES
IUD Insertion:  CONSULT:    Is a pregnancy test required: Yes.  Was it positive or negative?  Negative  Was a consent obtained?  Yes    Subjective: Mindi Bourne is a 23 year old No obstetric history on file. presents for IUD and desires Mirena type IUD.    Patient has been given the opportunity to ask questions about all forms of birth control, including all options appropriate for Mindi Bourne. Discussed that no method of birth control, except abstinence is 100% effective against pregnancy or sexually transmitted infection.     Mindi Bourne understands she may have the IUD removed at any time. IUD should be removed by a health care provider.    The entire insertion procedure was reviewed with the patient, including care after placement.    Patient's last menstrual period was 01/08/2024 (approximate). . No allergy to betadine or shellfish. Recent STD screening  HCG Qual Urine   Date Value Ref Range Status   07/23/2013 Negative NEG Final         LMP 01/08/2024 (Approximate)     Pelvic Exam:   EG/BUS: normal genital architecture without lesions, erythema or abnormal secretions.   Vagina: moist, pink, rugae with physiologic discharge and secretions  Cervix: nulliparous no lesions and pink, moist, closed, without lesion or CMT  Uterus: nulliposition, mobile, no pain  Adnexa: within normal limits and no masses, nodularity, tenderness    PROCEDURE NOTE: -- IUD Insertion    Reason for Insertion: contraception      Under sterile technique, cervix was visualized with speculum and prepped with Betadine solution swab x 3. Tenaculum was placed for stability. The uterus was gently straightened and sounded to 6.5 cm. IUD prepared for placement, and IUD inserted according to 's instructions without difficulty or significant resitance, and deployed at the fundus. The strings were visualized and trimmed to 3.0 cm from the external os. Tenaculum was removed and hemostasis noted. Speculum removed.   Patient tolerated procedure well.    Lot # [id number]  Exp: 1/2026    EBL: minimal    Complications: none    ASSESSMENT: No diagnosis found.     PLAN:    Given 's handouts, including when to have IUD removed, list of danger s/sx, side effects and follow up recommended. Encouraged condom use for prevention of STD. Back up contraception advised for 7 days if progestin method. Advised to call for any fever, for prolonged or severe pain or bleeding, abnormal vaginal discharge, or unable to palpate strings. She was advised to use pain medications (ibuprofen) as needed for mild to moderate pain. Advised to follow-up in clinic in 4-6 weeks for IUD string check if unable to find strings or as directed by provider.     ALFRED Rosario Ra CNP

## 2024-02-27 NOTE — TELEPHONE ENCOUNTER
FUTURE VISIT INFORMATION      FUTURE VISIT INFORMATION:  Date: 6/25/24  Time: 1:00pm  Location: [facility]  REFERRAL INFORMATION:  Referring provider:  Kassi Montiel Ra, ALFRED CNP   Referring providers clinic:  [facility]  Reason for visit/diagnosis  Upper back pain [M54.9] Chronic pain of both shoulders [M25.511, G89.29, M25.512]     RECORDS REQUESTED FROM:       Clinic name Comments Records Status Imaging Status   [facility] Ov/referral 2/22/24 epic

## 2024-05-27 ENCOUNTER — MYC MEDICAL ADVICE (OUTPATIENT)
Dept: FAMILY MEDICINE | Facility: CLINIC | Age: 24
End: 2024-05-27
Payer: COMMERCIAL

## 2024-05-27 DIAGNOSIS — M25.511 CHRONIC PAIN OF BOTH SHOULDERS: ICD-10-CM

## 2024-05-27 DIAGNOSIS — M54.9 UPPER BACK PAIN: Primary | ICD-10-CM

## 2024-05-27 DIAGNOSIS — G89.29 CHRONIC PAIN OF BOTH SHOULDERS: ICD-10-CM

## 2024-05-27 DIAGNOSIS — M25.512 CHRONIC PAIN OF BOTH SHOULDERS: ICD-10-CM

## 2024-05-28 NOTE — TELEPHONE ENCOUNTER
Please see  message regarding referral request. Recommend appointment to discuss?    Vicki Reynolds RN on 5/28/2024 at 9:31 AM

## 2024-06-07 NOTE — TELEPHONE ENCOUNTER
Sent MCM to patient letting her know referral was placed.     Amy Cheung  Lead   [facility]

## 2024-06-13 ENCOUNTER — THERAPY VISIT (OUTPATIENT)
Dept: PHYSICAL THERAPY | Facility: CLINIC | Age: 24
End: 2024-06-13
Attending: NURSE PRACTITIONER
Payer: COMMERCIAL

## 2024-06-13 DIAGNOSIS — M25.511 CHRONIC RIGHT SHOULDER PAIN: Primary | ICD-10-CM

## 2024-06-13 DIAGNOSIS — M25.512 CHRONIC PAIN OF BOTH SHOULDERS: ICD-10-CM

## 2024-06-13 DIAGNOSIS — M25.511 CHRONIC PAIN OF BOTH SHOULDERS: ICD-10-CM

## 2024-06-13 DIAGNOSIS — G89.29 CHRONIC RIGHT SHOULDER PAIN: Primary | ICD-10-CM

## 2024-06-13 DIAGNOSIS — M54.9 UPPER BACK PAIN: ICD-10-CM

## 2024-06-13 DIAGNOSIS — M54.2 CERVICALGIA: ICD-10-CM

## 2024-06-13 DIAGNOSIS — G89.29 CHRONIC PAIN OF BOTH SHOULDERS: ICD-10-CM

## 2024-06-13 DIAGNOSIS — M54.6 BILATERAL THORACIC BACK PAIN: ICD-10-CM

## 2024-06-13 PROCEDURE — 97110 THERAPEUTIC EXERCISES: CPT | Mod: GP | Performed by: PHYSICAL THERAPIST

## 2024-06-13 PROCEDURE — 97161 PT EVAL LOW COMPLEX 20 MIN: CPT | Mod: GP | Performed by: PHYSICAL THERAPIST

## 2024-06-13 NOTE — PROGRESS NOTES
PHYSICAL THERAPY EVALUATION  Type of Visit: Evaluation              Subjective   Patient reports to PT for neck, upper back and right shoulder and arm pain that has been going on for over 2 years and has been getting worse. She has an appointment to set up a breast reduction surgery to help with the pain.  States the pain is achy. Pain is making it difficult for her to fall asleep at night, driving increases the pain and just general activity and movement throughout the day increases the pain. Massage, heat, ice, Tylenol and ibuprofen all help temporarily.       Presenting condition or subjective complaint: PT before my breast reduction  Date of onset:      Relevant medical history: Arthritis   Dates & types of surgery:      Prior diagnostic imaging/testing results:       Prior therapy history for the same diagnosis, illness or injury: No        Living Environment  Social support: With family members   Type of home: [facility]   Stairs to enter the home: Yes 3 Is there a railing: Yes     Ramp: No   Stairs inside the home: Yes 3 Is there a railing: Yes     Help at home: Self Cares (home health aide/personal care attendant, family, etc)  Equipment owned:       Employment: Yes Manager  Hobbies/Interests:      Patient goals for therapy:      Pain assessment:  Current pain 7/10, At best 0/10, At worst 10/10     Objective   CERVICAL SPINE EVALUATION  POSTURE:  forward head and rounded shoulders   ROM:  Cervical ROM WNL, Thoracic ROM rotation min loss B and min loss with thoracic extension   MYOTOMES: WNL  DERMATOMES: WNL  FLEXIBILITY:  decreased flexibility of R UT and bilateral pectoralis    SPECIAL TESTS: WNL  PALPATION:  TTP R UT, rhomboids and levator scapulae and upper thoracic vertabrae  SPINAL SEGMENTAL CONCLUSIONS:  Hypomobile upper thoracic spine  SHOULDER EVALUATION  ROM: AROM WNL  Pain in right shoulder with active flexion and abduction at end range   STRENGTH:  middle trap R -4/5 and L 4/5. Lower trap R 3/5 and  L +3/5  SPECIAL TESTS:  Positive Neers on R  POSTURE: elevated right scapula    Assessment & Plan   CLINICAL IMPRESSIONS  Medical Diagnosis: Upper back pain  Chronic pain of both shoulders    Treatment Diagnosis: Cervicalgia, Thoracic pain and Right shoulder pain   Impression/Assessment: Patient is a 24 year old female with neck, upper back and shoulder pain complaints.  The following significant findings have been identified: Pain, Decreased ROM/flexibility, Decreased joint mobility, Decreased strength, Impaired muscle performance, Decreased activity tolerance, and Impaired posture. These impairments interfere with their ability to perform self care tasks, work tasks, recreational activities, household chores, driving , household mobility, and community mobility as compared to previous level of function.     Clinical Decision Making (Complexity):  Clinical Presentation: Stable/Uncomplicated  Clinical Presentation Rationale: based on medical and personal factors listed in PT evaluation  Clinical Decision Making (Complexity): Low complexity    PLAN OF CARE  Treatment Interventions:  Interventions: Manual Therapy, Neuromuscular Re-education, Therapeutic Activity, Therapeutic Exercise, Self-Care/Home Management    Long Term Goals     PT Goal 1  Goal Identifier: Goal 1  Goal Description: Patient will be able to fall asleep within 30 minutes of lying done due to pain to help restor normal sleep pattern  Rationale: to maximize safety and independence with performance of ADLs and functional tasks;to maximize safety and independence within the home;to maximize safety and independence within the community;to maximize safety and independence with transportation;to maximize safety and independence with self cares  Target Date: 08/08/24  PT Goal 2  Goal Identifier: Goal 2  Goal Description: Patient will be able to drive 20 minutes with pain at worst 4/10  Rationale: to maximize safety and independence within the community;to  maximize safety and independence with transportation  Target Date: 08/08/24      Frequency of Treatment: 1 x a week  Duration of Treatment: 8 weeks    Recommended Referrals to Other Professionals:   Education Assessment:   Learner/Method: No Barriers to Learning  Education Comments: PTRX    Risks and benefits of evaluation/treatment have been explained.   Patient/Family/caregiver agrees with Plan of Care.     Evaluation Time:     PT Eval, Low Complexity Minutes (11760): 22       Signing Clinician: Fei Gill PT

## 2024-06-19 ENCOUNTER — THERAPY VISIT (OUTPATIENT)
Dept: PHYSICAL THERAPY | Facility: CLINIC | Age: 24
End: 2024-06-19
Payer: COMMERCIAL

## 2024-06-19 DIAGNOSIS — M54.6 BILATERAL THORACIC BACK PAIN: ICD-10-CM

## 2024-06-19 DIAGNOSIS — M54.2 CERVICALGIA: ICD-10-CM

## 2024-06-19 DIAGNOSIS — G89.29 CHRONIC RIGHT SHOULDER PAIN: Primary | ICD-10-CM

## 2024-06-19 DIAGNOSIS — M25.511 CHRONIC RIGHT SHOULDER PAIN: Primary | ICD-10-CM

## 2024-06-19 PROCEDURE — 97140 MANUAL THERAPY 1/> REGIONS: CPT | Mod: GP | Performed by: PHYSICAL THERAPIST

## 2024-06-19 PROCEDURE — 97112 NEUROMUSCULAR REEDUCATION: CPT | Mod: GP | Performed by: PHYSICAL THERAPIST

## 2024-06-19 PROCEDURE — 97110 THERAPEUTIC EXERCISES: CPT | Mod: GP | Performed by: PHYSICAL THERAPIST

## 2024-06-25 ENCOUNTER — OFFICE VISIT (OUTPATIENT)
Dept: PLASTIC SURGERY | Facility: CLINIC | Age: 24
End: 2024-06-25
Attending: NURSE PRACTITIONER
Payer: COMMERCIAL

## 2024-06-25 ENCOUNTER — PRE VISIT (OUTPATIENT)
Dept: PLASTIC SURGERY | Facility: CLINIC | Age: 24
End: 2024-06-25

## 2024-06-25 VITALS
WEIGHT: 160.9 LBS | SYSTOLIC BLOOD PRESSURE: 111 MMHG | HEART RATE: 67 BPM | BODY MASS INDEX: 26.81 KG/M2 | OXYGEN SATURATION: 100 % | HEIGHT: 65 IN | DIASTOLIC BLOOD PRESSURE: 74 MMHG

## 2024-06-25 DIAGNOSIS — M54.9 UPPER BACK PAIN: ICD-10-CM

## 2024-06-25 DIAGNOSIS — G89.29 CHRONIC PAIN OF BOTH SHOULDERS: ICD-10-CM

## 2024-06-25 DIAGNOSIS — M25.511 CHRONIC PAIN OF BOTH SHOULDERS: ICD-10-CM

## 2024-06-25 DIAGNOSIS — M25.512 CHRONIC PAIN OF BOTH SHOULDERS: ICD-10-CM

## 2024-06-25 DIAGNOSIS — N62 MACROMASTIA: Primary | ICD-10-CM

## 2024-06-25 PROCEDURE — 99204 OFFICE O/P NEW MOD 45 MIN: CPT | Performed by: PHYSICIAN ASSISTANT

## 2024-06-25 ASSESSMENT — PAIN SCALES - GENERAL: PAINLEVEL: MILD PAIN (3)

## 2024-06-25 NOTE — LETTER
6/25/2024       RE: Mindi Bourne  [id number]  [facility] [address]     Dear Colleague,    Thank you for referring your patient, Mindi Bourne, to the [facility] [place] at [facility]. Please see a copy of my visit note below.    PLASTIC SURGERY HISTORY AND PHYSICAL    Chief Complaint:Hypertrophy of breasts   Referring Provider: Kassi Montiel      HPI:  Mindi Bourne  is a 24 year old female who presents with symptomatic macromastia. She currently wears a 34G but unsure, would prefer to be a C. She complains of back pain, neck pain, shoulder pain and arm pain for the past 10 years. Recently lost 80lbs with diet changes over the past 1.5 years and this did reduce some of her pain but some has still persisted. She has tried the following without successful resolution of pain symptoms: currently in PT, weight loss, stretching, ice/heat, pain medication including ibuprofen PRN, wearing multiple bras, buying specialty bras. denies family history of breast cancer. No personal breast issues lumps/bumps.     Recent mammogram: none  Rashes: redness and rashes in the past over the years, no prescriptions or topicals used.     PMH:   Past Medical History:   Diagnosis Date    Depression     Depression     Depressive disorder    anxiety    PSH:   No past surgical history on file.    FH:   No family history on file.     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    MEDS:     Current Outpatient Medications:     buPROPion (WELLBUTRIN XL) 150 MG 24 hr tablet, Take 1 tablet (150 mg) by mouth every morning, Disp: 90 tablet, Rfl: 1    FLUoxetine  "(PROZAC) 20 MG capsule, Take 3 capsules (60 mg) by mouth daily Needs appointment for further refills, Disp: 270 capsule, Rfl: 1    hydrOXYzine sumeet (VISTARIL) 25 MG capsule, Take 1-2 capsules (25-50 mg) by mouth daily as needed for anxiety, Disp: 30 capsule, Rfl: 0    levonorgestrel (MIRENA) 52 MG (20 mcg/day) IUD, 1 each by Intrauterine route once, Disp: , Rfl:        ALLERGIES:     Allergies   Allergen Reactions    Seasonal Allergies      Sneezing, itchy eyes.         ROS: Denies chest pain, shortness of breath, MI, CVA, DVT, PE, and bleeding disorders.     PHYSICAL EXAMINATION:   /74 (BP Location: Left arm, Patient Position: Sitting, Cuff Size: Adult Regular)   Pulse 67   Ht 1.645 m (5' 4.75\")   Wt 73 kg (160 lb 14.4 oz)   SpO2 100%   BMI 26.98 kg/m     BMI: Body mass index is 26.98 kg/m .  Body surface area is 1.83 meters squared.   General: NAD  Chest: bilateral macromastia with grade III ptosis. breasts approx 700g. +upper pole striae and atrophy. nipple to notch 34cm bilaterally.   +shoulder grooving    460g     ASSESSMENT:  24 year old female PMH depression, anxiety, weight loss of 80lbs who presents with symptomatic macromastia desiring breast reduction.     PLAN:     I believe she is a reasonable candidate for a breast reduction. She has lost weight and gone to PT without relief in symptoms. Prior to surgery she will need smoking cessation. According to Schnur scale 460g will need to be removed, this will be approximately 70-80% reduction from her current size. She understands that this amount will need to be removed to be covered by insurance. I think she will be smaller than she initially anticipated, perhaps a small B cup. She is ok with that.     Risks and benefits of the procedure were discussed, including but not limited to bleeding, infection, scarring, wound healing complications, asymmetry, loss of nipple sensation, nipple necrosis, inability to breast feed and change in size of breasts " in the future. She understands the risks and would like to proceed with surgery.    I explained the surgical scheduling process, including picking a surgical date and then our team submitting for prior auth 6 weeks prior to this date.     Shawn contact us after 2 weeks nicotine free so I can place orders.       45 minutes spent on the date of the encounter doing chart review, history and physical, documentation and further activity as noted above.         Again, thank you for allowing me to participate in the care of your patient.      Sincerely,    Pattie Gao PA-C

## 2024-06-25 NOTE — PROGRESS NOTES
PLASTIC SURGERY HISTORY AND PHYSICAL    Chief Complaint:Hypertrophy of breasts   Referring Provider: Kassi Montiel      HPI:  Mindi Bourne  is a 24 year old female who presents with symptomatic macromastia. She currently wears a 34G but unsure, would prefer to be a C. She complains of back pain, neck pain, shoulder pain and arm pain for the past 10 years. Recently lost 80lbs with diet changes over the past 1.5 years and this did reduce some of her pain but some has still persisted. She has tried the following without successful resolution of pain symptoms: currently in PT, weight loss, stretching, ice/heat, pain medication including ibuprofen PRN, wearing multiple bras, buying specialty bras. denies family history of breast cancer. No personal breast issues lumps/bumps.     Recent mammogram: none  Rashes: redness and rashes in the past over the years, no prescriptions or topicals used.     PMH:   Past Medical History:   Diagnosis Date    Depression     Depression     Depressive disorder    anxiety    PSH:   No past surgical history on file.    FH:   No family history on file.     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    MEDS:     Current Outpatient Medications:     buPROPion (WELLBUTRIN XL) 150 MG 24 hr tablet, Take 1 tablet (150 mg) by mouth every morning, Disp: 90 tablet, Rfl: 1    FLUoxetine (PROZAC) 20 MG capsule, Take 3 capsules (60 mg) by mouth daily Needs appointment for further refills, Disp: 270 capsule, Rfl: 1    hydrOXYzine sumeet (VISTARIL) 25 MG capsule, Take 1-2 capsules (25-50 mg) by mouth daily as needed for anxiety, Disp: 30 capsule, Rfl: 0    levonorgestrel (MIRENA) 52 MG (20 mcg/day) IUD, 1 each by Intrauterine  "route once, Disp: , Rfl:        ALLERGIES:     Allergies   Allergen Reactions    Seasonal Allergies      Sneezing, itchy eyes.         ROS: Denies chest pain, shortness of breath, MI, CVA, DVT, PE, and bleeding disorders.     PHYSICAL EXAMINATION:   /74 (BP Location: Left arm, Patient Position: Sitting, Cuff Size: Adult Regular)   Pulse 67   Ht 1.645 m (5' 4.75\")   Wt 73 kg (160 lb 14.4 oz)   SpO2 100%   BMI 26.98 kg/m     BMI: Body mass index is 26.98 kg/m .  Body surface area is 1.83 meters squared.   General: NAD  Chest: bilateral macromastia with grade III ptosis. breasts approx 700g. +upper pole striae and atrophy. nipple to notch 34cm bilaterally.   +shoulder grooving    460g     ASSESSMENT:  24 year old female PMH depression, anxiety, weight loss of 80lbs who presents with symptomatic macromastia desiring breast reduction.     PLAN:     I believe she is a reasonable candidate for a breast reduction. She has lost weight and gone to PT without relief in symptoms. Prior to surgery she will need smoking cessation. According to Schnur scale 460g will need to be removed, this will be approximately 70-80% reduction from her current size. She understands that this amount will need to be removed to be covered by insurance. I think she will be smaller than she initially anticipated, perhaps a small B cup. She is ok with that.     Risks and benefits of the procedure were discussed, including but not limited to bleeding, infection, scarring, wound healing complications, asymmetry, loss of nipple sensation, nipple necrosis, inability to breast feed and change in size of breasts in the future. She understands the risks and would like to proceed with surgery.    I explained the surgical scheduling process, including picking a surgical date and then our team submitting for prior auth 6 weeks prior to this date.     Shawn contact us after 2 weeks nicotine free so I can place orders.     Pattie Gao PA-C  Plastic " and Reconstructive Surgery     45 minutes spent on the date of the encounter doing chart review, history and physical, documentation and further activity as noted above.

## 2024-06-25 NOTE — NURSING NOTE
"Chief Complaint   Patient presents with    Consult       Vitals:    06/25/24 1055   BP: 111/74   BP Location: Left arm   Patient Position: Sitting   Cuff Size: Adult Regular   Pulse: 67   SpO2: 100%   Weight: 160 lb 14.4 oz   Height: 5' 4.75\"       Body mass index is 26.98 kg/m .    Nestor Orosco EMT-P    "

## 2024-07-03 ENCOUNTER — THERAPY VISIT (OUTPATIENT)
Dept: PHYSICAL THERAPY | Facility: CLINIC | Age: 24
End: 2024-07-03
Payer: COMMERCIAL

## 2024-07-03 DIAGNOSIS — M25.511 CHRONIC RIGHT SHOULDER PAIN: Primary | ICD-10-CM

## 2024-07-03 DIAGNOSIS — M54.2 CERVICALGIA: ICD-10-CM

## 2024-07-03 DIAGNOSIS — M54.6 BILATERAL THORACIC BACK PAIN: ICD-10-CM

## 2024-07-03 DIAGNOSIS — G89.29 CHRONIC RIGHT SHOULDER PAIN: Primary | ICD-10-CM

## 2024-07-03 PROCEDURE — 97112 NEUROMUSCULAR REEDUCATION: CPT | Mod: GP | Performed by: PHYSICAL THERAPIST

## 2024-07-03 PROCEDURE — 97140 MANUAL THERAPY 1/> REGIONS: CPT | Mod: GP | Performed by: PHYSICAL THERAPIST

## 2024-07-03 PROCEDURE — 97110 THERAPEUTIC EXERCISES: CPT | Mod: GP | Performed by: PHYSICAL THERAPIST

## 2024-07-13 ENCOUNTER — MYC REFILL (OUTPATIENT)
Dept: FAMILY MEDICINE | Facility: CLINIC | Age: 24
End: 2024-07-13
Payer: COMMERCIAL

## 2024-07-13 DIAGNOSIS — F41.1 GAD (GENERALIZED ANXIETY DISORDER): ICD-10-CM

## 2024-07-13 DIAGNOSIS — G47.00 INSOMNIA, UNSPECIFIED TYPE: ICD-10-CM

## 2024-07-15 RX ORDER — BUPROPION HYDROCHLORIDE 150 MG/1
150 TABLET ORAL EVERY MORNING
Qty: 90 TABLET | Refills: 1 | Status: SHIPPED | OUTPATIENT
Start: 2024-07-15

## 2024-07-17 DIAGNOSIS — N62 MACROMASTIA: Primary | ICD-10-CM

## 2024-07-26 ENCOUNTER — TELEPHONE (OUTPATIENT)
Dept: PLASTIC SURGERY | Facility: CLINIC | Age: 24
End: 2024-07-26
Payer: COMMERCIAL

## 2024-07-26 PROBLEM — N62 MACROMASTIA: Status: ACTIVE | Noted: 2024-07-17

## 2024-07-26 NOTE — TELEPHONE ENCOUNTER
This writer called and spoke to the patient to see if this was a good time to schedule surgery with Dr. Monsalve. Patient stated that she is working right now and would like a call back in 30 minutes. Both parties expressed understanding.    Criss Cabral on 7/26/2024 at 2:36 PM

## 2024-07-26 NOTE — TELEPHONE ENCOUNTER
RN Care Coordinator: Faby Cabral    Surgery is scheduled with Dr. Dr. Monsalve   Date: 12/4/2024   Location[facility]      H&P to be completed by: completed by: Primary Care team, patient instructed to schedule PCP - per patient, this will be scheduled with ALFRED Rosario CNP     Surgical consult: 11/13/2024 at 11:30am with Dr. Monsalve [facility]    Post-op: 12/17/2024 at 10:40am with Pattie Gao PA-C, [facility]      Patient was informed that a surgery center pre-op RN will call 2-3 days prior to surgery with arrival time and instructions: Yes      Spoke with patient      Patient questions/concerns: N/A       Surgery packet: to be sent via [employer]    __    Criss Cabral on 7/26/2024 at 3:21 PM  P: [phone]

## 2024-08-22 PROBLEM — G89.29 CHRONIC RIGHT SHOULDER PAIN: Status: RESOLVED | Noted: 2024-06-13 | Resolved: 2024-08-22

## 2024-08-22 PROBLEM — M25.511 CHRONIC RIGHT SHOULDER PAIN: Status: RESOLVED | Noted: 2024-06-13 | Resolved: 2024-08-22

## 2024-08-22 PROBLEM — M54.6 BILATERAL THORACIC BACK PAIN: Status: RESOLVED | Noted: 2024-06-13 | Resolved: 2024-08-22

## 2024-08-22 PROBLEM — M54.2 CERVICALGIA: Status: RESOLVED | Noted: 2024-06-13 | Resolved: 2024-08-22

## 2024-08-22 NOTE — PROGRESS NOTES
DISCHARGE  Reason for Discharge: Patient has failed to schedule further appointments.    Equipment Issued:     Discharge Plan: Patient to continue home program.    Referring Provider:  Kassi Montiel

## 2024-09-05 ENCOUNTER — IMMUNIZATION (OUTPATIENT)
Dept: FAMILY MEDICINE | Facility: CLINIC | Age: 24
End: 2024-09-05
Payer: COMMERCIAL

## 2024-09-05 DIAGNOSIS — Z23 ENCOUNTER FOR IMMUNIZATION: Primary | ICD-10-CM

## 2024-09-05 PROCEDURE — 99207 PR NO CHARGE NURSE ONLY: CPT

## 2024-09-05 PROCEDURE — 90673 RIV3 VACCINE NO PRESERV IM: CPT

## 2024-09-05 PROCEDURE — 90471 IMMUNIZATION ADMIN: CPT

## 2024-09-05 NOTE — PROGRESS NOTES
Prior to immunization administration, verified patients identity using patient s name and date of birth. Please see Immunization Activity for additional information.     Screening Questionnaire for Adult Immunization    Are you sick today?   No   Do you have allergies to medications, food, a vaccine component or latex?   No   Have you ever had a serious reaction after receiving a vaccination?   No   Do you have a long-term health problem with heart, lung, kidney, or metabolic disease (e.g., diabetes), asthma, a blood disorder, no spleen, complement component deficiency, a cochlear implant, or a spinal fluid leak?  Are you on long-term aspirin therapy?   No   Do you have cancer, leukemia, HIV/AIDS, or any other immune system problem?   No   Do you have a parent, brother, or sister with an immune system problem?   No   In the past 3 months, have you taken medications that affect  your immune system, such as prednisone, other steroids, or anticancer drugs; drugs for the treatment of rheumatoid arthritis, Crohn s disease, or psoriasis; or have you had radiation treatments?   No   Have you had a seizure, or a brain or other nervous system problem?   No   During the past year, have you received a transfusion of blood or blood    products, or been given immune (gamma) globulin or antiviral drug?   No   For women: Are you pregnant or is there a chance you could become       pregnant during the next month?   No   Have you received any vaccinations in the past 4 weeks?   No     Immunization questionnaire answers were all negative.    I have reviewed the following standing orders:   This patient is due and qualifies for the Influenza vaccine.    Click here for Influenza Vaccine Standing Order    I have reviewed the vaccines inclusion and exclusion criteria; No concerns regarding eligibility.     Patient instructed to remain in clinic for 15 minutes afterwards, and to report any adverse reactions.     Screening performed by  Keyonna uMnoz MA on 9/5/2024 at 2:38 PM.

## 2024-11-04 ENCOUNTER — OFFICE VISIT (OUTPATIENT)
Dept: FAMILY MEDICINE | Facility: CLINIC | Age: 24
End: 2024-11-04
Payer: COMMERCIAL

## 2024-11-04 VITALS
HEIGHT: 64 IN | SYSTOLIC BLOOD PRESSURE: 105 MMHG | WEIGHT: 170.3 LBS | OXYGEN SATURATION: 97 % | TEMPERATURE: 97.8 F | BODY MASS INDEX: 29.08 KG/M2 | DIASTOLIC BLOOD PRESSURE: 72 MMHG | HEART RATE: 66 BPM | RESPIRATION RATE: 16 BRPM

## 2024-11-04 DIAGNOSIS — G47.00 INSOMNIA, UNSPECIFIED TYPE: ICD-10-CM

## 2024-11-04 DIAGNOSIS — F41.1 GAD (GENERALIZED ANXIETY DISORDER): ICD-10-CM

## 2024-11-04 DIAGNOSIS — Z01.818 PREOP GENERAL PHYSICAL EXAM: Primary | ICD-10-CM

## 2024-11-04 DIAGNOSIS — M54.9 UPPER BACK PAIN: ICD-10-CM

## 2024-11-04 DIAGNOSIS — M25.511 CHRONIC PAIN OF BOTH SHOULDERS: ICD-10-CM

## 2024-11-04 DIAGNOSIS — N62 LARGE BREASTS: ICD-10-CM

## 2024-11-04 DIAGNOSIS — G89.29 CHRONIC PAIN OF BOTH SHOULDERS: ICD-10-CM

## 2024-11-04 DIAGNOSIS — M25.512 CHRONIC PAIN OF BOTH SHOULDERS: ICD-10-CM

## 2024-11-04 LAB
ANION GAP SERPL CALCULATED.3IONS-SCNC: 8 MMOL/L (ref 7–15)
BUN SERPL-MCNC: 11.8 MG/DL (ref 6–20)
CALCIUM SERPL-MCNC: 9.2 MG/DL (ref 8.8–10.4)
CHLORIDE SERPL-SCNC: 106 MMOL/L (ref 98–107)
CREAT SERPL-MCNC: 0.68 MG/DL (ref 0.51–0.95)
EGFRCR SERPLBLD CKD-EPI 2021: >90 ML/MIN/1.73M2
GLUCOSE SERPL-MCNC: 84 MG/DL (ref 70–99)
HCO3 SERPL-SCNC: 26 MMOL/L (ref 22–29)
HGB BLD-MCNC: 14.2 G/DL (ref 11.7–15.7)
POTASSIUM SERPL-SCNC: 4.4 MMOL/L (ref 3.4–5.3)
SODIUM SERPL-SCNC: 140 MMOL/L (ref 135–145)

## 2024-11-04 PROCEDURE — 80048 BASIC METABOLIC PNL TOTAL CA: CPT | Performed by: NURSE PRACTITIONER

## 2024-11-04 PROCEDURE — 85018 HEMOGLOBIN: CPT | Performed by: NURSE PRACTITIONER

## 2024-11-04 PROCEDURE — 36415 COLL VENOUS BLD VENIPUNCTURE: CPT | Performed by: NURSE PRACTITIONER

## 2024-11-04 PROCEDURE — 90480 ADMN SARSCOV2 VAC 1/ONLY CMP: CPT | Performed by: NURSE PRACTITIONER

## 2024-11-04 PROCEDURE — 99214 OFFICE O/P EST MOD 30 MIN: CPT | Mod: 25 | Performed by: NURSE PRACTITIONER

## 2024-11-04 PROCEDURE — 91320 SARSCV2 VAC 30MCG TRS-SUC IM: CPT | Performed by: NURSE PRACTITIONER

## 2024-11-04 RX ORDER — BUPROPION HYDROCHLORIDE 150 MG/1
150 TABLET ORAL EVERY MORNING
Qty: 90 TABLET | Refills: 1 | Status: SHIPPED | OUTPATIENT
Start: 2024-11-04

## 2024-11-04 RX ORDER — HYDROXYZINE PAMOATE 25 MG/1
25-50 CAPSULE ORAL DAILY PRN
Qty: 30 CAPSULE | Refills: 0 | Status: SHIPPED | OUTPATIENT
Start: 2024-11-04

## 2024-11-04 RX ORDER — FLUOXETINE 40 MG/1
80 CAPSULE ORAL DAILY
Qty: 180 CAPSULE | Refills: 0 | Status: SHIPPED | OUTPATIENT
Start: 2024-11-04

## 2024-11-04 ASSESSMENT — ANXIETY QUESTIONNAIRES
1. FEELING NERVOUS, ANXIOUS, OR ON EDGE: MORE THAN HALF THE DAYS
6. BECOMING EASILY ANNOYED OR IRRITABLE: NEARLY EVERY DAY
5. BEING SO RESTLESS THAT IT IS HARD TO SIT STILL: MORE THAN HALF THE DAYS
3. WORRYING TOO MUCH ABOUT DIFFERENT THINGS: MORE THAN HALF THE DAYS
GAD7 TOTAL SCORE: 14
4. TROUBLE RELAXING: MORE THAN HALF THE DAYS
GAD7 TOTAL SCORE: 14
IF YOU CHECKED OFF ANY PROBLEMS ON THIS QUESTIONNAIRE, HOW DIFFICULT HAVE THESE PROBLEMS MADE IT FOR YOU TO DO YOUR WORK, TAKE CARE OF THINGS AT HOME, OR GET ALONG WITH OTHER PEOPLE: VERY DIFFICULT
2. NOT BEING ABLE TO STOP OR CONTROL WORRYING: MORE THAN HALF THE DAYS
GAD7 TOTAL SCORE: 14
7. FEELING AFRAID AS IF SOMETHING AWFUL MIGHT HAPPEN: SEVERAL DAYS
8. IF YOU CHECKED OFF ANY PROBLEMS, HOW DIFFICULT HAVE THESE MADE IT FOR YOU TO DO YOUR WORK, TAKE CARE OF THINGS AT HOME, OR GET ALONG WITH OTHER PEOPLE?: VERY DIFFICULT
7. FEELING AFRAID AS IF SOMETHING AWFUL MIGHT HAPPEN: SEVERAL DAYS

## 2024-11-04 NOTE — PATIENT INSTRUCTIONS
How to Take Your Medication Before Surgery  Preoperative Medication Instructions   Antiplatelet or Anticoagulation Medication Instructions   - Patient is on no antiplatelet or anticoagulation medications.    Additional Medication Instructions  Take all scheduled medications on the day of surgery       Patient Education   Preparing for Your Surgery  For Adults  Getting started  In most cases, a nurse will call to review your health history and instructions. They will give you an arrival time based on your scheduled surgery time. Please be ready to share:  Your doctor's clinic name and phone number  Your medical, surgical, and anesthesia history  A list of allergies and sensitivities  A list of medicines, including herbal treatments and over-the-counter drugs  Whether the patient has a legal guardian (ask how to send us the papers in advance)  Note: You may not receive a call if you were seen at our PAC (Preoperative Assessment Center).  Please tell us if you're pregnant--or if there's any chance you might be pregnant. Some surgeries may injure a fetus (unborn baby), so they require a pregnancy test. Surgeries that are safe for a fetus don't always need a test, and you can choose whether to have one.   Preparing for surgery  Within 10 to 30 days of surgery: Have a pre-op exam (sometimes called an H&P, or History and Physical). This can be done at a clinic or pre-operative center.  If you're having a , you may not need this exam. Talk to your care team.  At your pre-op exam, talk to your care team about all medicines you take. (This includes CBD oil and any drugs, such as THC, marijuana, and other forms of cannabis.) If you need to stop any medicine before surgery, ask when to start taking it again.  This is for your safety. Many medicines and drugs can make you bleed too much during surgery. Some change how well surgery (anesthesia) drugs work.  Call your insurance company to let them know you're having  surgery. (If you don't have insurance, call [phone].)  Call your clinic if there's any change in your health. This includes a scrape or scratch near the surgery site, or any signs of a cold (sore throat, runny nose, cough, rash, fever).  Eating and drinking guidelines  For your safety: Unless your surgeon tells you otherwise, follow the guidelines below.  Eat and drink as normal until 8 hours before you arrive for surgery. After that, no food or milk. You can spit out gum when you arrive.  Drink clear liquids until 2 hours before you arrive. These are liquids you can see through, like water, Gatorade, and Propel Water. They also include plain black coffee and tea (no cream or milk).  No alcohol for 24 hours before you arrive. The night before surgery, stop any drinks that contain THC.  If your care team tells you to take medicine on the morning of surgery, it's okay to take it with a sip of water. No other medicines or drugs are allowed (including CBD oil)--follow your care team's instructions.  If you have questions the day of surgery, call your hospital or surgery center.   Preventing infection  Shower or bathe the night before and the morning of surgery. Follow the instructions your clinic gave you. (If no instructions, use regular soap.)  Don't shave or clip hair near your surgery site. We'll remove the hair if needed.  Don't smoke or vape the morning of surgery. No chewing tobacco for 6 hours before you arrive. A nicotine patch is okay. You may spit out nicotine gum when you arrive.  For some surgeries, the surgeon will tell you to fully quit smoking and nicotine.  We will make every effort to keep you safe from infection. We will:  Clean our hands often with soap and water (or an alcohol-based hand rub).  Clean the skin at your surgery site with a special soap that kills germs.  Give you a special gown to keep you warm. (Cold raises the risk of infection.)  Wear hair covers, masks, gowns, and gloves  during surgery.  Give antibiotic medicine, if prescribed. Not all surgeries need this medicine.  What to bring on the day of surgery  Photo ID and insurance card  Copy of your health care directive, if you have one  Glasses and hearing aids (bring cases)  You can't wear contacts during surgery  Inhaler and eye drops, if you use them (tell us about these when you arrive)  CPAP machine or breathing device, if you use them  A few personal items, if spending the night  If you have . . .  A pacemaker, ICD (cardiac defibrillator), or other implant: Bring the ID card.  An implanted stimulator: Bring the remote control.  A legal guardian: Bring a copy of the certified (court-stamped) guardianship papers.  Please remove any jewelry, including body piercings. Leave jewelry and other valuables at home.  If you're going home the day of surgery  You must have a responsible adult drive you home. They should stay with you overnight as well.  If you don't have someone to stay with you, and you aren't safe to go home alone, we may keep you overnight. Insurance often won't pay for this.  After surgery  If it's hard to control your pain or you need more pain medicine, please call your surgeon's office.  Questions?   If you have any questions for your care team, list them here:   ____________________________________________________________________________________________________________________________________________________________________________________________________________________________________________________________  For informational purposes only. Not to replace the advice of your health care provider. Copyright   2003, 2019 [facility]. All rights reserved. Clinically reviewed by Juan Esparza MD. [employer] [id number] - REV 08/24.

## 2024-11-04 NOTE — PROGRESS NOTES
Preoperative Evaluation  [facility]  [address] [facility] [address]  Phone: [phone]  Primary Provider: ALFRED Rosario Ra, CNP  Pre-op Performing Provider: ALFRED Rosario Ra, CNP  Nov 4, 2024 11/4/2024   Surgical Information   What procedure is being done? Breast Reduction    Facility or Hospital where procedure/surgery will be performed: [facility]    Who is doing the procedure / surgery? Dr. Monsalve    Date of surgery / procedure: December 4    Time of surgery / procedure: tba    Where do you plan to recover after surgery? at home with family        Patient-reported     Fax number for surgical facility: Note does not need to be faxed, will be available electronically in Epic.    Assessment & Plan     The proposed surgical procedure is considered INTERMEDIATE risk.    OLI (generalized anxiety disorder)  Increased symptoms.  Increase prozac to 80mg daily.  Follow up in 6 weeks.    - FLUoxetine (PROZAC) 40 MG capsule; Take 2 capsules (80 mg) by mouth daily. Needs appointment for further refills  - buPROPion (WELLBUTRIN XL) 150 MG 24 hr tablet; Take 1 tablet (150 mg) by mouth every morning.  - hydrOXYzine sumeet (VISTARIL) 25 MG capsule; Take 1-2 capsules (25-50 mg) by mouth daily as needed for anxiety.    Insomnia, unspecified type  Increased symptoms.  Will increase prozac to help with anxiety.  Could consider med change if no response.  - FLUoxetine (PROZAC) 40 MG capsule; Take 2 capsules (80 mg) by mouth daily. Needs appointment for further refills    Preop general physical exam  No further optimization required.  - Hemoglobin; Future  - Basic metabolic panel  (Ca, Cl, CO2, Creat, Gluc, K, Na, BUN); Future  - Hemoglobin  - Basic metabolic panel  (Ca, Cl, CO2, Creat, Gluc, K, Na, BUN)    Chronic pain of both shoulders  Procedure planned.    Upper back pain  Procedure planned.    Large breasts  Procedure planned.    The longitudinal plan of care for the  diagnosis(es)/condition(s) as documented were addressed during this visit. Due to the added complexity in care, I will continue to support Mindi in the subsequent management and with ongoing continuity of care.          - No identified additional risk factors other than previously addressed    Preoperative Medication Instructions  Antiplatelet or Anticoagulation Medication Instructions   - Patient is on no antiplatelet or anticoagulation medications.    Additional Medication Instructions  Take all scheduled medications on the day of surgery    Recommendation  Approval given to proceed with proposed procedure, without further diagnostic evaluation.    Alexys Obregon is a 24 year old, presenting for the following:  Pre-Op Exam          11/4/2024     9:42 AM   Additional Questions   Roomed by Lotus HOFFMAN   Accompanied by self         11/4/2024     9:42 AM   Patient Reported Additional Medications   Patient reports taking the following new medications n/a     HPI related to upcoming procedure: hx of chronic upper back and shoulder and neck pain.  Breast reduction planned.    Increased anxiety symptoms recently.  Lost her job.  Affecting sleep.  Tolerating medications well and finds them helpful.          11/4/2024   Pre-Op Questionnaire   Have you ever had a heart attack or stroke? No    Have you ever had surgery on your heart or blood vessels, such as a stent placement, a coronary artery bypass, or surgery on an artery in your head, neck, heart, or legs? No    Do you have chest pain with activity? No    Do you have a history of heart failure? No    Do you currently have a cold, bronchitis or symptoms of other infection? No    Do you have a cough, shortness of breath, or wheezing? (!) YES- pt has had cough starting october 28th    Do you or anyone in your family have previous history of blood clots? No    Do you or does anyone in your family have a serious bleeding problem such as prolonged bleeding following surgeries  or cuts? No    Have you ever had problems with anemia or been told to take iron pills? No    Have you had any abnormal blood loss such as black, tarry or bloody stools, or abnormal vaginal bleeding? No    Have you ever had a blood transfusion? No    Are you willing to have a blood transfusion if it is medically needed before, during, or after your surgery? Yes    Have you or any of your relatives ever had problems with anesthesia? No    Do you have sleep apnea, excessive snoring or daytime drowsiness? No    Do you have any artifical heart valves or other implanted medical devices like a pacemaker, defibrillator, or continuous glucose monitor? No    Do you have artificial joints? No    Are you allergic to latex? No        Patient-reported     Health Care Directive  Patient does not have a Health Care Directive: Discussed advance care planning with patient; however, patient declined at this time.    Preoperative Review of    reviewed - no record of controlled substances prescribed.      Status of Chronic Conditions:  See problem list for active medical problems.  Problems all longstanding and stable, except as noted/documented.  See ROS for pertinent symptoms related to these conditions.    Patient Active Problem List    Diagnosis Date Noted    Encounter for immunization 09/05/2024     Priority: Medium    Macromastia 07/17/2024     Priority: Medium    OLI (generalized anxiety disorder) 07/11/2019     Priority: Medium    Suicide attempt (H) 07/24/2013     Priority: Medium      Past Medical History:   Diagnosis Date    Depression     Depression     Depressive disorder      No past surgical history on file.  Current Outpatient Medications   Medication Sig Dispense Refill    buPROPion (WELLBUTRIN XL) 150 MG 24 hr tablet Take 1 tablet (150 mg) by mouth every morning 90 tablet 1    FLUoxetine (PROZAC) 20 MG capsule Take 3 capsules (60 mg) by mouth daily Needs appointment for further refills 270 capsule 1     "hydrOXYzine sumeet (VISTARIL) 25 MG capsule Take 1-2 capsules (25-50 mg) by mouth daily as needed for anxiety 30 capsule 0    levonorgestrel (MIRENA) 52 MG (20 mcg/day) IUD 1 each by Intrauterine route once         Allergies   Allergen Reactions    Seasonal Allergies      Sneezing, itchy eyes.         Social History     Tobacco Use    Smoking status: Former     Types: Vaping Device    Smokeless tobacco: Never   Substance Use Topics    Alcohol use: Never     No family history on file.  History   Drug Use Unknown             Review of Systems  Constitutional, HEENT, cardiovascular, pulmonary, gi and gu systems are negative, except as otherwise noted.    Objective    /72 (BP Location: Right arm, Patient Position: Sitting, Cuff Size: Adult Large)   Pulse 66   Temp 97.8  F (36.6  C) (Oral)   Resp 16   Ht 1.626 m (5' 4\")   Wt 77.2 kg (170 lb 4.8 oz)   SpO2 97%   BMI 29.23 kg/m     Estimated body mass index is 29.23 kg/m  as calculated from the following:    Height as of this encounter: 1.626 m (5' 4\").    Weight as of this encounter: 77.2 kg (170 lb 4.8 oz).  Physical Exam  GENERAL: alert and no distress  EYES: Eyes grossly normal to inspection  HENT: ear canals and TM's normal, nose and mouth without ulcers or lesions  NECK: no adenopathy, no asymmetry, masses, or scars  RESP: lungs clear to auscultation - no rales, rhonchi or wheezes  CV: regular rate and rhythm, normal S1 S2, no S3 or S4, no murmur, click or rub, no peripheral edema  ABDOMEN: soft, nontender, no hepatosplenomegaly, no masses and bowel sounds normal  NEURO: Normal strength and tone, mentation intact and speech normal  PSYCH: mentation appears normal, affect normal/bright    No results for input(s): \"HGB\", \"PLT\", \"INR\", \"NA\", \"POTASSIUM\", \"CR\", \"A1C\" in the last 8760 hours.     Diagnostics  Recent Results (from the past 24 hours)   Hemoglobin    Collection Time: 11/04/24 10:28 AM   Result Value Ref Range    Hemoglobin 14.2 11.7 - 15.7 g/dL "   Basic metabolic panel  (Ca, Cl, CO2, Creat, Gluc, K, Na, BUN)    Collection Time: 11/04/24 10:28 AM   Result Value Ref Range    Sodium 140 135 - 145 mmol/L    Potassium 4.4 3.4 - 5.3 mmol/L    Chloride 106 98 - 107 mmol/L    Carbon Dioxide (CO2) 26 22 - 29 mmol/L    Anion Gap 8 7 - 15 mmol/L    Urea Nitrogen 11.8 6.0 - 20.0 mg/dL    Creatinine 0.68 0.51 - 0.95 mg/dL    GFR Estimate >90 >60 mL/min/1.73m2    Calcium 9.2 8.8 - 10.4 mg/dL    Glucose 84 70 - 99 mg/dL      No EKG required, no history of coronary heart disease, significant arrhythmia, peripheral arterial disease or other structural heart disease.    Revised Cardiac Risk Index (RCRI)  The patient has the following serious cardiovascular risks for perioperative complications:   - No serious cardiac risks = 0 points     RCRI Interpretation: 0 points: Class I (very low risk - 0.4% complication rate)         Signed Electronically by: ALFRED Rosario Ra, CNP  A copy of this evaluation report is provided to the requesting physician.         Answers submitted by the patient for this visit:  Patient Health Questionnaire (G7) (Submitted on 11/4/2024)  OLI 7 TOTAL SCORE: 14

## 2024-11-13 ENCOUNTER — OFFICE VISIT (OUTPATIENT)
Dept: PLASTIC SURGERY | Facility: CLINIC | Age: 24
End: 2024-11-13
Payer: COMMERCIAL

## 2024-11-13 VITALS
HEIGHT: 64 IN | DIASTOLIC BLOOD PRESSURE: 73 MMHG | SYSTOLIC BLOOD PRESSURE: 118 MMHG | OXYGEN SATURATION: 95 % | WEIGHT: 170.8 LBS | BODY MASS INDEX: 29.16 KG/M2 | HEART RATE: 75 BPM

## 2024-11-13 DIAGNOSIS — N62 MACROMASTIA: ICD-10-CM

## 2024-11-13 DIAGNOSIS — M54.9 UPPER BACK PAIN: Primary | ICD-10-CM

## 2024-11-13 ASSESSMENT — PAIN SCALES - GENERAL: PAINLEVEL_OUTOF10: NO PAIN (0)

## 2024-11-13 NOTE — NURSING NOTE
"Chief Complaint   Patient presents with    Consult     Return plastic surgery, consult for upcoming surgery on 12/4/2024       Vitals:    11/13/24 1115   BP: 118/73   BP Location: Left arm   Patient Position: Sitting   Cuff Size: Adult Regular   Pulse: 75   SpO2: 95%   Weight: 77.5 kg (170 lb 12.8 oz)   Height: 1.626 m (5' 4\")       Body mass index is 29.32 kg/m .                          Kelsey Edwards"

## 2024-11-13 NOTE — LETTER
11/13/2024       RE: Mindi Bourne  [address] [address] [facility] [address]     Dear Colleague,    Thank you for referring your patient, Mindi Bourne, to the [facility] [place] [facility]. Please see a copy of my visit note below.    PREOPERATIVE VISIT NOTE     PRESENTING COMPLAINT:  Preop visit for upcoming bilateral breast reduction on 12/4/2024.     HISTORY OF PRESENTING COMPLAINT: The patient is here for a pre-op visit for the patient's surgery.  The patient is being seen in the presence of my nurse. There is no change since the patient's consultation. Please see the consult note for details.     No change in history and physical exam.  Patient would like to be a C cup.  Schnurr scale is 460 g.  Patient understands she will be a small B cup given the size she currently has and the amount we have to remove.  This was made very clear.  Major risks of wound problems, nipple numbness, asymmetries, inability to promise a cup size, and nipple healing issues were discussed in detail.  She understood everything and wants to proceed.  Preop completed today.     ASSESSMENT AND PLAN:  Based upon the above findings, the patient is here for a preop visit for upcoming surgery.  I had a brian, detailed discussion with the patient in the presence of my nurse (who was present from beginning to end) about the proposed surgery. I was very clear and detailed about overview of surgery, perioperative plans, and expectations. All risks, benefits and alternatives of the surgery including but not limited to pain, infection, bleeding, scarring, asymmetry, seromas, hematomas, wound breakdown, wound dehiscence, prominent scar, hypertrophic scar, keloid scar, requirement of further surgeries, skin/tissue necrosis, nerve/muscle/deeper structure injury, DVT, PE, MI, CVA, pneumonia, renal failure and death, were explained in detail. The  patient agreed and understood them all and had all the questions answered to the patient's satisfaction, and the patient wants to proceed with surgery. I look forward to helping the patient out in the near future.  All questions were answered.  The patient was happy with the visit.    Total time spent in the encounter today including chart review, visit itself, and post-visit paperwork was 30 minutes.       Again, thank you for allowing me to participate in the care of your patient.      Sincerely,    MARIELA Monsalve MD

## 2024-11-13 NOTE — NURSING NOTE
Pre Op Teaching Note:       Pre and Post op Patient Education    Relevant Diagnosis:  macromastia  Surgical procedure:  BRM    Patient demonstrates understanding of the following:  Date of surgery:  12/4/24  Location of surgery:  [facility] - 5th Floor  History and Physical and any other testing necessary prior to surgery: Yes, discussed with pt need for pre-op within 30 days of surgery with PCP.    Patient demonstrates understanding of the following:    Pre-op showering/scrub information with PCMX Soap: Yes, soap given in clinic today  Blood thinner medications discussed and when to stop (if applicable):  Per PCP at pre-op.     Post-op follow-up:  Discussed how to contact the hospital, nurse, and clinic scheduling staff if necessary. (See packet information)    Instructional materials used/given/mailed:  [place] [facility] Packet per surgery scheduler, post op teaching sheet, Soap.  Pt advised that final arrival information to come closer to the surgery date by [facility] nurses.

## 2024-11-13 NOTE — PROGRESS NOTES
PREOPERATIVE VISIT NOTE     PRESENTING COMPLAINT:  Preop visit for upcoming bilateral breast reduction on 12/4/2024.     HISTORY OF PRESENTING COMPLAINT: The patient is here for a pre-op visit for the patient's surgery.  The patient is being seen in the presence of my nurse. There is no change since the patient's consultation. Please see the consult note for details.     No change in history and physical exam.  Patient would like to be a C cup.  Schnurr scale is 460 g.  Patient understands she will be a small B cup given the size she currently has and the amount we have to remove.  This was made very clear.  Major risks of wound problems, nipple numbness, asymmetries, inability to promise a cup size, and nipple healing issues were discussed in detail.  She understood everything and wants to proceed.  Preop completed today.     ASSESSMENT AND PLAN:  Based upon the above findings, the patient is here for a preop visit for upcoming surgery.  I had a brian, detailed discussion with the patient in the presence of my nurse (who was present from beginning to end) about the proposed surgery. I was very clear and detailed about overview of surgery, perioperative plans, and expectations. All risks, benefits and alternatives of the surgery including but not limited to pain, infection, bleeding, scarring, asymmetry, seromas, hematomas, wound breakdown, wound dehiscence, prominent scar, hypertrophic scar, keloid scar, requirement of further surgeries, skin/tissue necrosis, nerve/muscle/deeper structure injury, DVT, PE, MI, CVA, pneumonia, renal failure and death, were explained in detail. The patient agreed and understood them all and had all the questions answered to the patient's satisfaction, and the patient wants to proceed with surgery. I look forward to helping the patient out in the near future.  All questions were answered.  The patient was happy with the visit.    Total time spent in the encounter today including chart  review, visit itself, and post-visit paperwork was 30 minutes.

## 2024-12-03 ENCOUNTER — ANESTHESIA EVENT (OUTPATIENT)
Dept: SURGERY | Facility: AMBULATORY SURGERY CENTER | Age: 24
End: 2024-12-03
Payer: COMMERCIAL

## 2024-12-04 ENCOUNTER — HOSPITAL ENCOUNTER (OUTPATIENT)
Facility: AMBULATORY SURGERY CENTER | Age: 24
Discharge: HOME | End: 2024-12-04
Attending: PLASTIC SURGERY
Payer: COMMERCIAL

## 2024-12-04 ENCOUNTER — ANESTHESIA (OUTPATIENT)
Dept: SURGERY | Facility: AMBULATORY SURGERY CENTER | Age: 24
End: 2024-12-04
Payer: COMMERCIAL

## 2024-12-04 VITALS
WEIGHT: 170 LBS | OXYGEN SATURATION: 96 % | BODY MASS INDEX: 29.02 KG/M2 | TEMPERATURE: 98.1 F | HEIGHT: 64 IN | HEART RATE: 76 BPM | SYSTOLIC BLOOD PRESSURE: 128 MMHG | RESPIRATION RATE: 16 BRPM | DIASTOLIC BLOOD PRESSURE: 85 MMHG

## 2024-12-04 DIAGNOSIS — N62 MACROMASTIA: Primary | ICD-10-CM

## 2024-12-04 LAB
HCG UR QL: NEGATIVE
INTERNAL QC OK POCT: NORMAL
POCT KIT EXPIRATION DATE: NORMAL
POCT KIT LOT NUMBER: NORMAL

## 2024-12-04 PROCEDURE — 88305 TISSUE EXAM BY PATHOLOGIST: CPT | Mod: 26 | Performed by: PATHOLOGY

## 2024-12-04 PROCEDURE — 88305 TISSUE EXAM BY PATHOLOGIST: CPT | Mod: TC | Performed by: PLASTIC SURGERY

## 2024-12-04 RX ORDER — FLUMAZENIL 0.1 MG/ML
0.2 INJECTION, SOLUTION INTRAVENOUS
Status: DISCONTINUED | OUTPATIENT
Start: 2024-12-04 | End: 2024-12-04 | Stop reason: HOSPADM

## 2024-12-04 RX ORDER — NALOXONE HYDROCHLORIDE 0.4 MG/ML
0.2 INJECTION, SOLUTION INTRAMUSCULAR; INTRAVENOUS; SUBCUTANEOUS
Status: DISCONTINUED | OUTPATIENT
Start: 2024-12-04 | End: 2024-12-04 | Stop reason: HOSPADM

## 2024-12-04 RX ORDER — NALOXONE HYDROCHLORIDE 0.4 MG/ML
0.4 INJECTION, SOLUTION INTRAMUSCULAR; INTRAVENOUS; SUBCUTANEOUS
Status: DISCONTINUED | OUTPATIENT
Start: 2024-12-04 | End: 2024-12-04 | Stop reason: HOSPADM

## 2024-12-04 RX ORDER — FENTANYL CITRATE 50 UG/ML
50 INJECTION, SOLUTION INTRAMUSCULAR; INTRAVENOUS EVERY 5 MIN PRN
Status: ACTIVE | OUTPATIENT
Start: 2024-12-04

## 2024-12-04 RX ORDER — AMOXICILLIN 250 MG
1-2 CAPSULE ORAL 2 TIMES DAILY
Qty: 30 TABLET | Refills: 0 | Status: SHIPPED | OUTPATIENT
Start: 2024-12-04

## 2024-12-04 RX ORDER — NALOXONE HYDROCHLORIDE 0.4 MG/ML
0.1 INJECTION, SOLUTION INTRAMUSCULAR; INTRAVENOUS; SUBCUTANEOUS
Status: ACTIVE | OUTPATIENT
Start: 2024-12-04

## 2024-12-04 RX ORDER — DEXAMETHASONE SODIUM PHOSPHATE 4 MG/ML
INJECTION, SOLUTION INTRA-ARTICULAR; INTRALESIONAL; INTRAMUSCULAR; INTRAVENOUS; SOFT TISSUE PRN
Status: DISCONTINUED | OUTPATIENT
Start: 2024-12-04 | End: 2024-12-04

## 2024-12-04 RX ORDER — OXYCODONE HYDROCHLORIDE 5 MG/1
5 TABLET ORAL
Status: COMPLETED | OUTPATIENT
Start: 2024-12-04 | End: 2024-12-04

## 2024-12-04 RX ORDER — FENTANYL CITRATE 50 UG/ML
25 INJECTION, SOLUTION INTRAMUSCULAR; INTRAVENOUS EVERY 5 MIN PRN
Status: ACTIVE | OUTPATIENT
Start: 2024-12-04

## 2024-12-04 RX ORDER — HYDROMORPHONE HYDROCHLORIDE 1 MG/ML
0.4 INJECTION, SOLUTION INTRAMUSCULAR; INTRAVENOUS; SUBCUTANEOUS EVERY 5 MIN PRN
Status: ACTIVE | OUTPATIENT
Start: 2024-12-04

## 2024-12-04 RX ORDER — SODIUM CHLORIDE, SODIUM LACTATE, POTASSIUM CHLORIDE, CALCIUM CHLORIDE 600; 310; 30; 20 MG/100ML; MG/100ML; MG/100ML; MG/100ML
INJECTION, SOLUTION INTRAVENOUS CONTINUOUS PRN
Status: DISCONTINUED | OUTPATIENT
Start: 2024-12-04 | End: 2024-12-04

## 2024-12-04 RX ORDER — LIDOCAINE HYDROCHLORIDE 20 MG/ML
INJECTION, SOLUTION INFILTRATION; PERINEURAL PRN
Status: DISCONTINUED | OUTPATIENT
Start: 2024-12-04 | End: 2024-12-04

## 2024-12-04 RX ORDER — HYDROMORPHONE HYDROCHLORIDE 1 MG/ML
0.2 INJECTION, SOLUTION INTRAMUSCULAR; INTRAVENOUS; SUBCUTANEOUS EVERY 5 MIN PRN
Status: ACTIVE | OUTPATIENT
Start: 2024-12-04

## 2024-12-04 RX ORDER — PROPOFOL 10 MG/ML
INJECTION, EMULSION INTRAVENOUS CONTINUOUS PRN
Status: DISCONTINUED | OUTPATIENT
Start: 2024-12-04 | End: 2024-12-04

## 2024-12-04 RX ORDER — LIDOCAINE 40 MG/G
CREAM TOPICAL
Status: DISCONTINUED | OUTPATIENT
Start: 2024-12-04 | End: 2024-12-04 | Stop reason: HOSPADM

## 2024-12-04 RX ORDER — PROPOFOL 10 MG/ML
INJECTION, EMULSION INTRAVENOUS PRN
Status: DISCONTINUED | OUTPATIENT
Start: 2024-12-04 | End: 2024-12-04

## 2024-12-04 RX ORDER — DEXAMETHASONE SODIUM PHOSPHATE 10 MG/ML
4 INJECTION, SOLUTION INTRAMUSCULAR; INTRAVENOUS
Status: ACTIVE | OUTPATIENT
Start: 2024-12-04

## 2024-12-04 RX ORDER — FENTANYL CITRATE 50 UG/ML
INJECTION, SOLUTION INTRAMUSCULAR; INTRAVENOUS PRN
Status: DISCONTINUED | OUTPATIENT
Start: 2024-12-04 | End: 2024-12-04

## 2024-12-04 RX ORDER — OXYCODONE HYDROCHLORIDE 5 MG/1
10 TABLET ORAL
Status: COMPLETED | OUTPATIENT
Start: 2024-12-04 | End: 2024-12-04

## 2024-12-04 RX ORDER — CEFAZOLIN SODIUM 2 G/50ML
2 SOLUTION INTRAVENOUS
Status: COMPLETED | OUTPATIENT
Start: 2024-12-04 | End: 2024-12-04

## 2024-12-04 RX ORDER — FENTANYL CITRATE 50 UG/ML
25-50 INJECTION, SOLUTION INTRAMUSCULAR; INTRAVENOUS
Status: DISCONTINUED | OUTPATIENT
Start: 2024-12-04 | End: 2024-12-04 | Stop reason: HOSPADM

## 2024-12-04 RX ORDER — ONDANSETRON 4 MG/1
4 TABLET, ORALLY DISINTEGRATING ORAL EVERY 8 HOURS PRN
Qty: 4 TABLET | Refills: 0 | Status: SHIPPED | OUTPATIENT
Start: 2024-12-04

## 2024-12-04 RX ORDER — ONDANSETRON 4 MG/1
4 TABLET, ORALLY DISINTEGRATING ORAL EVERY 30 MIN PRN
Status: ACTIVE | OUTPATIENT
Start: 2024-12-04

## 2024-12-04 RX ORDER — ONDANSETRON 2 MG/ML
4 INJECTION INTRAMUSCULAR; INTRAVENOUS EVERY 30 MIN PRN
Status: ACTIVE | OUTPATIENT
Start: 2024-12-04

## 2024-12-04 RX ORDER — CEFAZOLIN SODIUM 2 G/50ML
2 SOLUTION INTRAVENOUS SEE ADMIN INSTRUCTIONS
Status: DISCONTINUED | OUTPATIENT
Start: 2024-12-04 | End: 2024-12-04 | Stop reason: HOSPADM

## 2024-12-04 RX ORDER — BUPIVACAINE HYDROCHLORIDE 2.5 MG/ML
INJECTION, SOLUTION EPIDURAL; INFILTRATION; INTRACAUDAL
Status: COMPLETED | OUTPATIENT
Start: 2024-12-04 | End: 2024-12-04

## 2024-12-04 RX ORDER — ACETAMINOPHEN 325 MG/1
975 TABLET ORAL ONCE
Status: COMPLETED | OUTPATIENT
Start: 2024-12-04 | End: 2024-12-04

## 2024-12-04 RX ORDER — OXYCODONE HYDROCHLORIDE 5 MG/1
5-10 TABLET ORAL EVERY 6 HOURS PRN
Qty: 20 TABLET | Refills: 0 | Status: SHIPPED | OUTPATIENT
Start: 2024-12-04

## 2024-12-04 RX ORDER — IBUPROFEN 200 MG
800 TABLET ORAL ONCE
Status: COMPLETED | OUTPATIENT
Start: 2024-12-04 | End: 2024-12-04

## 2024-12-04 RX ORDER — ONDANSETRON 2 MG/ML
INJECTION INTRAMUSCULAR; INTRAVENOUS PRN
Status: DISCONTINUED | OUTPATIENT
Start: 2024-12-04 | End: 2024-12-04

## 2024-12-04 RX ADMIN — FENTANYL CITRATE 50 MCG: 50 INJECTION, SOLUTION INTRAMUSCULAR; INTRAVENOUS at 13:39

## 2024-12-04 RX ADMIN — PROPOFOL 200 MCG/KG/MIN: 10 INJECTION, EMULSION INTRAVENOUS at 14:24

## 2024-12-04 RX ADMIN — BUPIVACAINE HYDROCHLORIDE 20 ML: 2.5 INJECTION, SOLUTION EPIDURAL; INFILTRATION; INTRACAUDAL at 13:25

## 2024-12-04 RX ADMIN — ONDANSETRON 4 MG: 2 INJECTION INTRAMUSCULAR; INTRAVENOUS at 13:05

## 2024-12-04 RX ADMIN — Medication 0.5 MG: at 14:24

## 2024-12-04 RX ADMIN — PROPOFOL 200 MCG/KG/MIN: 10 INJECTION, EMULSION INTRAVENOUS at 13:05

## 2024-12-04 RX ADMIN — OXYCODONE HYDROCHLORIDE 5 MG: 5 TABLET ORAL at 15:39

## 2024-12-04 RX ADMIN — PROPOFOL 200 MCG/KG/MIN: 10 INJECTION, EMULSION INTRAVENOUS at 13:39

## 2024-12-04 RX ADMIN — PROPOFOL 200 MCG/KG/MIN: 10 INJECTION, EMULSION INTRAVENOUS at 14:15

## 2024-12-04 RX ADMIN — PROPOFOL 50 MG: 10 INJECTION, EMULSION INTRAVENOUS at 15:05

## 2024-12-04 RX ADMIN — Medication 800 MG: at 16:37

## 2024-12-04 RX ADMIN — DEXAMETHASONE SODIUM PHOSPHATE 4 MG: 4 INJECTION, SOLUTION INTRA-ARTICULAR; INTRALESIONAL; INTRAMUSCULAR; INTRAVENOUS; SOFT TISSUE at 13:05

## 2024-12-04 RX ADMIN — SODIUM CHLORIDE, SODIUM LACTATE, POTASSIUM CHLORIDE, CALCIUM CHLORIDE: 600; 310; 30; 20 INJECTION, SOLUTION INTRAVENOUS at 12:19

## 2024-12-04 RX ADMIN — Medication 0.5 MG: at 13:00

## 2024-12-04 RX ADMIN — FENTANYL CITRATE 50 MCG: 50 INJECTION, SOLUTION INTRAMUSCULAR; INTRAVENOUS at 15:46

## 2024-12-04 RX ADMIN — OXYCODONE HYDROCHLORIDE 5 MG: 5 TABLET ORAL at 16:38

## 2024-12-04 RX ADMIN — ACETAMINOPHEN 975 MG: 325 TABLET ORAL at 11:39

## 2024-12-04 RX ADMIN — PROPOFOL 150 MG: 10 INJECTION, EMULSION INTRAVENOUS at 13:05

## 2024-12-04 RX ADMIN — FENTANYL CITRATE 50 MCG: 50 INJECTION, SOLUTION INTRAMUSCULAR; INTRAVENOUS at 13:04

## 2024-12-04 RX ADMIN — CEFAZOLIN SODIUM 2 G: 2 SOLUTION INTRAVENOUS at 13:00

## 2024-12-04 NOTE — OP NOTE
PREOPERATIVE DIAGNOSIS: Symptomatic bilateral breast hypertrophy.     POSTOPERATIVE DIAGNOSIS: Symptomatic bilateral breast hypertrophy.     PROCEDURES: Bilateral superomedial pedicle inverted T skin closure breast reduction.     SURGEON: Fernando Monsalve MD.     RESIDENT: Amado Murray MD.    ANESTHESIA: General anesthesia with LMA.    COMPLICATIONS: Nil.     DRAINS: Nil.     Blood Loss: 250 mL    SPECIMENS: Skin and breast tissue from right breast measuring about 590 g, left breast measuring about 497 g.     DESCRIPTION OF PROCEDURE: After informed consent was taken, the proper site and procedure was ascertained with the patient and was appropriately marked and taken to in the operating room.  She was placed in supine position with the knees comfortably flexed with pillows underneath them, and pneumoboots placed and running prior to induction of anesthesia. Preoperative antibiotics given in the OR. All pressure points were appropriately padded. General anesthesia was administered without any complications. She was placed in such a position that she could be flexed to about 50 degrees. Her arms were padded and abducted to about 50 degrees. She was prepped and draped in a standard surgical fashion. I began by first remarking the preop markings and marking a 42 mm areola on each side. I then marked out a superior medial pedicle on each side. I then de-epithelialized the pedicle on each side. I then dissected out the pedicle on each side without actually seeing the deep fascia and also released the pedicle such that it could rotate into its new nipple position without any tension. I then went ahead and on each side excised the inferomedial, inferior, inferolateral and lateral aspects of the breast according to a vertical pattern reduction. Strict hemostasis was ensured during this entire part of the case. Once this was done, I then temporarily closed the patient's breast in an inverted T fashion, sat the patient up ensured  symmetry and then marked out the new areolar opening symmetrically on each side. I then went ahead and closed the horizontal incision using 2-0 Monocryl suture in a deep dermal layer. Then I made sure that the nipple areolar complex could be retrieved without any tension, which is could on each side. I then checked that they were both pink and viable, which they were. I then went ahead and excised the skin of the new areolar opening and de-epithelialized epithelialized the portion that was involved in the pedicle on each side. I then sutured in the nipple areolar complex using 2-0 Monocryl suture in a deep dermal fashion circumferentially. I then closed the vertical incision using 2-0 Monocryl suture to approximate the medial and lateral pillars, and then the deep dermis. I then ran all the incisions with 3-0 Stratafix suture in a running intracuticular manner followed by placement of Prineo, and then followed by an ACE wrap. At the end of the case, the patient's breasts were soft, nipples were pink, and breasts were symmetric. The patient tolerated the procedure well. All counts correct at the end of the case. The patient was extubated and sent to recovery room in a stable condition.

## 2024-12-04 NOTE — ANESTHESIA PREPROCEDURE EVALUATION
Anesthesia Pre-Procedure Evaluation    Patient: Mindi Bourne   MRN: [id number] : 2000        Procedure : Procedure(s):  MAMMOPLASTY, REDUCTION, BILATERAL          Past Medical History:   Diagnosis Date    Depression     Depression     Depressive disorder       History reviewed. No pertinent surgical history.   Allergies   Allergen Reactions    Seasonal Allergies      Sneezing, itchy eyes.       Social History     Tobacco Use    Smoking status: Former     Types: Vaping Device    Smokeless tobacco: Never   Substance Use Topics    Alcohol use: Never      Wt Readings from Last 1 Encounters:   24 77.1 kg (170 lb)        Anesthesia Evaluation   Pt has had prior anesthetic.     No history of anesthetic complications       ROS/MED HX  ENT/Pulmonary:  - neg pulmonary ROS     Neurologic:  - neg neurologic ROS     Cardiovascular:  - neg cardiovascular ROS     METS/Exercise Tolerance: >4 METS    Hematologic:  - neg hematologic  ROS     Musculoskeletal:  - neg musculoskeletal ROS     GI/Hepatic:  - neg GI/hepatic ROS     Renal/Genitourinary:  - neg Renal ROS     Endo:  - neg endo ROS     Psychiatric/Substance Use:  - neg psychiatric ROS     Infectious Disease:  - neg infectious disease ROS     Malignancy:  - neg malignancy ROS     Other:  - neg other ROS          Physical Exam    Airway        Mallampati: II   TM distance: > 3 FB   Neck ROM: full   Mouth opening: > 3 cm    Respiratory Devices and Support         Dental       (+) Completely normal teeth      Cardiovascular   cardiovascular exam normal          Pulmonary   pulmonary exam normal                OUTSIDE LABS:  CBC:   Lab Results   Component Value Date    WBC 5.2 2013    HGB 14.2 2024    HGB 13.9 2013    HCT 39.4 2013     2013     BMP:   Lab Results   Component Value Date     2024     2013    POTASSIUM 4.4 2024    POTASSIUM 3.8 2013    CHLORIDE 106 2024    CHLORIDE 104  "07/23/2013    CO2 26 11/04/2024    CO2 23 07/23/2013    BUN 11.8 11/04/2024    BUN 10 07/23/2013    CR 0.68 11/04/2024    CR 0.66 07/23/2013    GLC 84 11/04/2024     (H) 07/23/2013     COAGS: No results found for: \"PTT\", \"INR\", \"FIBR\"  POC:   Lab Results   Component Value Date    HCG Negative 12/04/2024     HEPATIC: No results found for: \"ALBUMIN\", \"PROTTOTAL\", \"ALT\", \"AST\", \"GGT\", \"ALKPHOS\", \"BILITOTAL\", \"BILIDIRECT\", \"MICHELA\"  OTHER:   Lab Results   Component Value Date    DOMITILA 9.2 11/04/2024       Anesthesia Plan    ASA Status:  2    NPO Status:  NPO Appropriate    Anesthesia Type: General.     - Airway: LMA      Maintenance: TIVA.        Consents    Anesthesia Plan(s) and associated risks, benefits, and realistic alternatives discussed. Questions answered and patient/representative(s) expressed understanding.     - Discussed:     - Discussed with:  Patient            Postoperative Care    Pain management: IV analgesics, Oral pain medications, Peripheral nerve block (Single Shot), Multi-modal analgesia.   PONV prophylaxis: Background Propofol Infusion, Ondansetron (or other 5HT-3)     Comments:               Kelby Evans MD    I have reviewed the pertinent notes and labs in the chart from the past 30 days and (re)examined the patient.  Any updates or changes from those notes are reflected in this note.                         # Overweight: Estimated body mass index is 29.18 kg/m  as calculated from the following:    Height as of this encounter: 1.626 m (5' 4\").    Weight as of this encounter: 77.1 kg (170 lb).             "

## 2024-12-04 NOTE — ANESTHESIA CARE TRANSFER NOTE
Patient: Mindi Bourne    Procedure: Procedure(s):  MAMMOPLASTY, REDUCTION, BILATERAL       Diagnosis: Macromastia [N62]  Diagnosis Additional Information: No value filed.    Anesthesia Type:   No value filed.     Note:    Oropharynx: oropharynx clear of all foreign objects and spontaneously breathing  Level of Consciousness: drowsy  Oxygen Supplementation: face mask  Level of Supplemental Oxygen (L/min / FiO2): 6  Independent Airway: airway patency satisfactory and stable  Dentition: dentition unchanged  Vital Signs Stable: post-procedure vital signs reviewed and stable  Report to RN Given: handoff report given  Patient transferred to: PACU  Comments: Report to PACU RN. Resps easy and regular.   Handoff Report: Identifed the Patient, Identified the Reponsible Provider, Reviewed the pertinent medical history, Discussed the surgical course, Reviewed Intra-OP anesthesia mangement and issues during anesthesia, Set expectations for post-procedure period and Allowed opportunity for questions and acknowledgement of understanding      Vitals:  Vitals Value Taken Time   /80 12/04/24 1521   Temp 36.4  C (97.6  F) 12/04/24 1521   Pulse 87 12/04/24 1529   Resp 19 12/04/24 1529   SpO2 98 % 12/04/24 1529   Vitals shown include unfiled device data.    Electronically Signed By: ALFRED MCDOWELL CRNA  December 4, 2024  3:29 PM

## 2024-12-04 NOTE — ANESTHESIA POSTPROCEDURE EVALUATION
Patient: Mindi Bourne    Procedure: Procedure(s):  MAMMOPLASTY, REDUCTION, BILATERAL       Anesthesia Type:  No value filed.    Note:  Disposition: Outpatient   Postop Pain Control: Uneventful            Sign Out: Well controlled pain   PONV: No   Neuro/Psych: Uneventful            Sign Out: Acceptable/Baseline neuro status   Airway/Respiratory: Uneventful            Sign Out: Acceptable/Baseline resp. status   CV/Hemodynamics: Uneventful            Sign Out: Acceptable CV status; No obvious hypovolemia; No obvious fluid overload   Other NRE: NONE   DID A NON-ROUTINE EVENT OCCUR? No           Last vitals:  Vitals Value Taken Time   BP 98/52 12/04/24 1530   Temp 36.4  C (97.6  F) 12/04/24 1521   Pulse 84 12/04/24 1542   Resp 12 12/04/24 1542   SpO2 97 % 12/04/24 1540   Vitals shown include unfiled device data.    Electronically Signed By: Kelby Evans MD  December 4, 2024  3:43 PM

## 2024-12-04 NOTE — ANESTHESIA PROCEDURE NOTES
"Pectoralis Procedure Note    Pre-Procedure   Staff -        Anesthesiologist:  Dank Carrasquillo MD       Performed By: anesthesiologist       Location: OR       Pre-Anesthestic Checklist: patient identified, IV checked, site marked, risks and benefits discussed, informed consent, monitors and equipment checked, pre-op evaluation, at physician/surgeon's request and post-op pain management  Timeout:       Correct Patient: Yes        Correct Procedure: Yes        Correct Site: Yes        Correct Position: Yes        Correct Laterality: Yes        Site Marked: Yes  Procedure Documentation  Procedure: Pectoralis             Pectoralis II and Pectoralis I       Laterality: bilateral       Patient Position: supine       Patient Prep/Sterile Barriers: sterile gloves       Skin prep: Chloraprep       Ultrasound guided       1. Ultrasound was used to identify targeted nerve, plexus, vascular marker, or fascial plane and place a needle adjacent to it in real-time.       2. Ultrasound was used to visualize the spread of anesthetic in close proximity to the above referenced structure.       3. A permanent image is entered into the patient's record.       4. The visualized anatomic structures appeared normal.       5. There were no apparent abnormal pathologic findings.    Assessment/Narrative         The placement was negative for: blood aspirated       Bolus given via needle..        Secured via.        Insertion/Infusion Method: Single Shot    Medication(s) Administered   Bupivacaine 0.25% PF (Infiltration) - Infiltration   20 mL - 12/4/2024 1:25:00 PM  Bupivacaine liposome (Exparel) 1.3% LA inj susp (Infiltration) - Infiltration   20 mL - 12/4/2024 1:25:00 PM    FOR [facility] ([facility]) ONLY:   Pain Team Contact information: please page the Pain Team Via [employer]. Search \"Pain\". During daytime hours, please page the attending first. At night please page the resident first.      "

## 2024-12-04 NOTE — BRIEF OP NOTE
[facility]    Brief Operative Note    Pre-operative diagnosis: Macromastia [N62]  Post-operative diagnosis Same as pre-operative diagnosis    Procedure: MAMMOPLASTY, REDUCTION, BILATERAL, Bilateral - Breast    Surgeon: Surgeons and Role:     * MARIELA Monsalve MD - Primary     * Amado Murray MD - Resident - Assisting  Anesthesia: General with Block   Estimated Blood Loss: 250 ml    Drains: None  Specimens:   ID Type Source Tests Collected by Time Destination   1 : Left breast tissue Tissue Breast, Left SURGICAL PATHOLOGY EXAM MARIELA Monsalve MD 12/4/2024  2:08 PM    2 : Right breast tissue Tissue Breast, Right SURGICAL PATHOLOGY EXAM MARIELA Monsalve MD 12/4/2024  1:55 PM      Findings:   None.  Complications: None.  Implants: * No implants in log *    Amado Murray MD   PGY2 Plastic and Reconstructive Surgery

## 2024-12-04 NOTE — DISCHARGE INSTRUCTIONS
Breast Reduction Post Op Instructions     General:   Have someone at home with you for the first 24 hours. You may need additional support for the first week.   Get plenty of rest and eat a balanced diet.   Drink plenty of fluids to help with healing and to help prevent constipation.    Avoid alcohol for a minimum of 3 weeks after surgery as it can cause fluid retention.   No smoking or any nicotine products.      Activity:   Start walking around your house as soon as possible, this will help reduce swelling and decrease risk of blood clots.   You may use your arms for activities of daily living (eating, drinking, dressing, showering, etc.) but avoid overuse of arms as it may increase swelling.    Avoid strenuous activities, no working out for 4-6 weeks post op.  In general, you can drive around 1-2 weeks post op. You must be off all narcotic pain medications and able to maneuver arms quickly to react.    No heavy lifting, nothing greater than 10lbs until 6 weeks post op.     Medications:    You can take Ibuprofen 400-800 mg and Tylenol 650 mg for pain relief. Please take each every 6 hours, and for optimal pain relief - please stagger the medications so that you are taking one or the other every 3 hours. It is best to make a chart/schedule of this to stay organized. If you are taking additional pain medications, please do not exceed 4000 mg of Tylenol daily from all sources.   You have been prescribed additional pain meds such as narcotics and muscle relaxants, please take as instructed and as needed. Take these medications in addition to tylenol/ibuprofen.    If you are taking narcotic medications, please do not operate heavy machinery or drive.      Incision Care:   Incisions have a tape and glue in place. Please leave in place for 2-3 weeks. If it starts to peel off on it s own you can trim back the tape.    Ok to moisturize incisions with aquafor or vaseline around 10 days post op.    Ok to get incisions wet in  the shower. No soaking in tubs or baths for 6 weeks or until all incisions/wounds have healed.   Please wear an ace wrap or sports bra for compression for 6 weeks. Avoid underwire bras during this time.   Monitor incisions daily for signs of infection such as spreading redness to skin.   Some drainage from incisions can be expected. Most common area for drainage is under the middle portion of the breast. Use gauze, bandaids or pads as needed to catch this drainage.     What to Expect:  You may experience temporary soreness, bruising, swelling and tightness in the breasts as well as discomfort in the incision area.    You may have decreased sensation to the nipples. Generally this improves with time but can be permanent. Occasionally people have hypersensitivity to the nipples, this will resolve over the next 6 weeks.     You may have random shooting pains, tingling, or other strange sensations in the skin for a few months. These will subside.   The breasts will be firm and swollen initially but will settle and soften over time     Follow up:   Follow up with your Surgeon or Physician Assistant will be 2 weeks after surgery     When to call:   Please call the office and/or consider return to the ER if you experience worsening pain not relieved by medications, increased swelling, redness to skin or high fevers >101F or if there are unexpected problems like shortness of breath.   Contact us on [employer] Monday - Friday, 8 a.m. - 4:30 p.m. or call [phone] to speak with our nursing team   After hours and on weekends, call Hospital Paging at [phone], and ask for the Plastic Surgery Resident on call     [facility]  Home Care Following Anesthesia  For 24 hours after surgery:  Get plenty of rest.  A responsible adult must stay with you for at least 24 hours after you leave the surgery center.  Do not drive or use heavy equipment.  If you have weakness or tingling, don't drive or  "use heavy equipment until this feeling goes away.   Do not drink alcohol.   Avoid strenuous or risky activities.  Ask for help when climbing stairs.  You may feel lightheaded.  IF so, sit for a few minutes before standing.  Have someone help you get up.   If you have nausea (feel sick to your stomach): Drink only clear liquids such as apple juice, ginger ale, broth or 7-Up.  Rest may also help.  Be sure to drink enough fluids.  Move to a regular diet as you feel able.   You may have a slight fever.  Call the doctor if your fever is over 100 F (37.7 C) (taken under the tongue) or lasts longer than 24 hours.  You may have a dry mouth, a sore throat, muscle aches or trouble sleeping. These should go away after 24 hours.  Do not make important or legal decisions.   It is recommended to avoid smoking.        Today you received an Exparel block to numb the nerves near your surgery site.  This is a block using local anesthetic or \"numbing\" medication injected around the nerves to anesthetize or \"numb\" the area supplied by those nerves.  This block is injected into the muscle layer near your surgical site.  This medication may numb the location where you had surgery up to 72 hours.  If your surgical site is an arm or leg you should be careful with your affected limb, since it is possible to injure your limb without being aware of it due to the numbing.  Until full feeling returns, you should guard against bumping or hitting your limb, and avoid extreme hot or cold temperatures on the skin.  As the block wears off, the feeling will return as a tingling or prickly sensation near your surgical site.  You will experince more discomfort from your incision as the feeling returns.  You may want to take a pain pill (a narcotic or Tylenol if this was prescribed by your surgeon) when you start to experience mild pain before the pain beomes more severe.  If your pain medications do not control your pain, you should notify your " surgeon.    Tips for taking pain medications  To get the best pain relief possible, remember these points:  Take pain medications as directed, before pain becomes severe.  Pain medication can upset your stomach: taking it with food may help.  Constipation is a common side effect of pain medication. Drink plenty of  fluids.  Eat foods high in fiber. Take a stool softener if recommended by your doctor or pharmacist.  Do not drink alcohol, drive or operate machinery while taking pain medications.  Ask about other ways to control pain, such as with heat, ice or relaxation.    Tylenol/Acetaminophen Consumption    If you feel your pain relief is insufficient, you may take Tylenol/Acetaminophen in addition to your narcotic pain medication.   Be careful not to exceed 4,000 mg of Tylenol/Acetaminophen in a 24 hour period from all sources.  If you are taking extra strength Tylenol/acetaminophen (500 mg), the maximum dose is 8 tablets in 24 hours.  If you are taking regular strength acetaminophen (325 mg), the maximum dose is 12 tablets in 24 hours.    Tylenol 975 mg given at 11:40 AM. Ok to take more after 5:40 PM.       Call a doctor for any of the following:  Signs of infection (fever, growing tenderness at the surgery site, a large amount of drainage or bleeding, severe pain, foul-smelling drainage, redness, swelling).  It has been over 8 to 10 hours since surgery and you are still not able to urinate (pass water).  Headache for over 24 hours.  Signs of Covid-19 infection (temperature over 100 degrees, shortness of breath, cough, loss of taste/smell, generalized body aches, persistent headache, chills, sore throat, nausea/vomiting/diarrhea)  Your doctor is:  Dr. Fernando Monsalve, Plastic Surgery: [phone]  Or dial [phone] and ask for the resident on call for:  Plastics  For emergency care, call the:  [facility]:  [phone] (TTY for hearing impaired: [phone])

## 2024-12-04 NOTE — ANESTHESIA PROCEDURE NOTES
Airway       Patient location during procedure: OR  Staff -        CRNA: Vicki Christine APRN CRNA       Performed By: CRNA  Consent for Airway        Urgency: elective  Indications and Patient Condition       Indications for airway management: ra-procedural       Induction type:intravenous       Mask difficulty assessment: 0 - not attempted    Final Airway Details       Final airway type: supraglottic airway    Supraglottic Airway Details        Type: LMA       Brand: LMA Unique       LMA size: 4    Post intubation assessment        Placement verified by: capnometry and equal breath sounds        Number of attempts at approach: 1       Secured with: tape       Ease of procedure: easy       Dentition: Intact and Unchanged

## 2024-12-10 LAB
PATH REPORT.COMMENTS IMP SPEC: NORMAL
PATH REPORT.COMMENTS IMP SPEC: NORMAL
PATH REPORT.FINAL DX SPEC: NORMAL
PATH REPORT.GROSS SPEC: NORMAL
PATH REPORT.MICROSCOPIC SPEC OTHER STN: NORMAL
PATH REPORT.RELEVANT HX SPEC: NORMAL
PHOTO IMAGE: NORMAL

## 2024-12-16 NOTE — PROGRESS NOTES
"Plastic Surgery Outpatient Visit    ID: Mindi DUNCAN JORDON Bourne is a 24 year old female s/p bilateral breast reduction 12/4/2024 with Dr. Monsalve     S: overall doing well. No pain. Happy with size. Had a good experience with everything related to the surgery and is doing well.     O:  /77 (BP Location: Left arm, Patient Position: Sitting, Cuff Size: Adult Regular)   Pulse 65   Temp 97.4  F (36.3  C) (Oral)   Ht 1.626 m (5' 4\")   Wt 74.4 kg (164 lb 1.6 oz)   SpO2 98%   BMI 28.17 kg/m     General: NAD  Chest: bilateral breast incision CDI. Nipples intact and viable. No significant swelling or ecchymosis.     PATH:   Benign breast tissue    A/P:  -healing well  -tape off in 1 week  -moisturize with vaseline or aquafor for a few weeks, then ok to start scar care if desired with silicone strips or bio oil  -continue soft bra and lifting restrictions, <10lbs, until 6 weeks post op. Then ok to increase activity slowly  -start self breast exams to learn new breast contour, may have some areas of firmness that soften over time  -RTC PRN, call with any concerns    Pattie Gao PA-C  Plastic and Reconstructive Surgery    15 minutes spent on the date of the encounter doing chart review, history and physical, dressing changes, documentation and further activity as noted above.    "

## 2024-12-17 ENCOUNTER — VIRTUAL VISIT (OUTPATIENT)
Dept: FAMILY MEDICINE | Facility: CLINIC | Age: 24
End: 2024-12-17
Payer: COMMERCIAL

## 2024-12-17 ENCOUNTER — OFFICE VISIT (OUTPATIENT)
Dept: PLASTIC SURGERY | Facility: CLINIC | Age: 24
End: 2024-12-17
Payer: COMMERCIAL

## 2024-12-17 VITALS
HEART RATE: 65 BPM | WEIGHT: 164.1 LBS | HEIGHT: 64 IN | SYSTOLIC BLOOD PRESSURE: 125 MMHG | BODY MASS INDEX: 28.01 KG/M2 | OXYGEN SATURATION: 98 % | DIASTOLIC BLOOD PRESSURE: 77 MMHG | TEMPERATURE: 97.4 F

## 2024-12-17 DIAGNOSIS — N62 MACROMASTIA: Primary | ICD-10-CM

## 2024-12-17 DIAGNOSIS — G47.00 INSOMNIA, UNSPECIFIED TYPE: ICD-10-CM

## 2024-12-17 DIAGNOSIS — F41.1 GAD (GENERALIZED ANXIETY DISORDER): ICD-10-CM

## 2024-12-17 PROCEDURE — 99214 OFFICE O/P EST MOD 30 MIN: CPT | Mod: 95 | Performed by: NURSE PRACTITIONER

## 2024-12-17 PROCEDURE — G2211 COMPLEX E/M VISIT ADD ON: HCPCS | Mod: 95 | Performed by: NURSE PRACTITIONER

## 2024-12-17 RX ORDER — FLUOXETINE 40 MG/1
80 CAPSULE ORAL DAILY
Qty: 180 CAPSULE | Refills: 3 | Status: SHIPPED | OUTPATIENT
Start: 2024-12-17

## 2024-12-17 RX ORDER — BUPROPION HYDROCHLORIDE 150 MG/1
150 TABLET ORAL EVERY MORNING
Qty: 90 TABLET | Refills: 3 | Status: SHIPPED | OUTPATIENT
Start: 2024-12-17

## 2024-12-17 ASSESSMENT — PAIN SCALES - GENERAL: PAINLEVEL_OUTOF10: NO PAIN (0)

## 2024-12-17 NOTE — NURSING NOTE
"Chief Complaint   Patient presents with    Surgical Followup     2 week post-op, DOS 12/4/24.       Vitals:    12/17/24 1045   BP: 125/77   BP Location: Left arm   Patient Position: Sitting   Cuff Size: Adult Regular   Pulse: 65   Temp: 97.4  F (36.3  C)   TempSrc: Oral   SpO2: 98%   Weight: 164 lb 1.6 oz   Height: 5' 4\"       Body mass index is 28.17 kg/m .      Fly Nunn, EMT    "

## 2024-12-17 NOTE — LETTER
"12/17/2024       RE: Mindi Bourne  [address]  [facility] [address]     Dear Colleague,    Thank you for referring your patient, Mindi Bourne, to the [facility] [place] at [facility]. Please see a copy of my visit note below.    Plastic Surgery Outpatient Visit    ID: Mindi Bourne is a 24 year old female s/p bilateral breast reduction 12/4/2024 with Dr. Monsalve     S: overall doing well. No pain. Happy with size. Had a good experience with everything related to the surgery and is doing well.     O:  /77 (BP Location: Left arm, Patient Position: Sitting, Cuff Size: Adult Regular)   Pulse 65   Temp 97.4  F (36.3  C) (Oral)   Ht 1.626 m (5' 4\")   Wt 74.4 kg (164 lb 1.6 oz)   SpO2 98%   BMI 28.17 kg/m     General: NAD  Chest: bilateral breast incision CDI. Nipples intact and viable. No significant swelling or ecchymosis.     PATH:   Benign breast tissue    A/P:  -healing well  -tape off in 1 week  -moisturize with vaseline or aquafor for a few weeks, then ok to start scar care if desired with silicone strips or bio oil  -continue soft bra and lifting restrictions, <10lbs, until 6 weeks post op. Then ok to increase activity slowly  -start self breast exams to learn new breast contour, may have some areas of firmness that soften over time  -RTC PRN, call with any concerns    aPttie Gao PA-C  Plastic and Reconstructive Surgery    15 minutes spent on the date of the encounter doing chart review, history and physical, dressing changes, documentation and further activity as noted above.      Again, thank you for allowing me to participate in the care of your patient.      Sincerely,    Pattie Gao PA-C    "

## 2024-12-17 NOTE — PROGRESS NOTES
"Mindi is a 24 year old who is being evaluated via a billable video visit.    How would you like to obtain your AVS? MyChart  If the video visit is dropped, the invitation should be resent by: Text to cell phone: [phone]  Will anyone else be joining your video visit? No      Assessment & Plan     OLI (generalized anxiety disorder)  Improved with dose increase.  Continue with current regimen.  - FLUoxetine (PROZAC) 40 MG capsule; Take 2 capsules (80 mg) by mouth daily. Needs appointment for further refills  - buPROPion (WELLBUTRIN XL) 150 MG 24 hr tablet; Take 1 tablet (150 mg) by mouth every morning.    Insomnia, unspecified type  See above.  - FLUoxetine (PROZAC) 40 MG capsule; Take 2 capsules (80 mg) by mouth daily. Needs appointment for further refills    The longitudinal plan of care for the diagnosis(es)/condition(s) as documented were addressed during this visit. Due to the added complexity in care, I will continue to support Mindi in the subsequent management and with ongoing continuity of care.        BMI  Estimated body mass index is 28.17 kg/m  as calculated from the following:    Height as of an earlier encounter on 12/17/24: 1.626 m (5' 4\").    Weight as of an earlier encounter on 12/17/24: 74.4 kg (164 lb 1.6 oz).       Subjective   Mindi is a 24 year old, presenting for the following health issues:  Follow Up (mood)      12/17/2024    11:39 AM   Additional Questions   Roomed by Tiffany RIVAS     Pt states she is doing great post surgery.      History of Present Illness       Reason for visit:  2 wk post op    She eats 0-1 servings of fruits and vegetables daily.She consumes 0 sweetened beverage(s) daily.She exercises with enough effort to increase her heart rate 9 or less minutes per day.  She exercises with enough effort to increase her heart rate 3 or less days per week.   She is taking medications regularly.       Doing well.  No concerns.  Recovered well after surgery.  Mood improved with " fluoxetine dose increase.  No side effects.  No change desired.        Review of Systems  Constitutional, HEENT, cardiovascular, pulmonary, gi and gu systems are negative, except as otherwise noted.      Objective             Physical Exam   GENERAL: alert and no distress  EYES: Eyes grossly normal to inspection.  No discharge or erythema, or obvious scleral/conjunctival abnormalities.  RESP: No audible wheeze, cough, or visible cyanosis.    SKIN: Visible skin clear. No significant rash, abnormal pigmentation or lesions.  NEURO: Cranial nerves grossly intact.  Mentation and speech appropriate for age.  PSYCH: Appropriate affect, tone, and pace of words          Video-Visit Details    Type of service:  Video Visit   Originating Location (pt. Location): Home    Distant Location (provider location):  On-site  Platform used for Video Visit: Rasta  Signed Electronically by: ALFRED Braun CNP

## 2025-01-02 ENCOUNTER — PATIENT OUTREACH (OUTPATIENT)
Dept: PLASTIC SURGERY | Facility: CLINIC | Age: 25
End: 2025-01-02
Payer: COMMERCIAL

## 2025-01-02 DIAGNOSIS — N62 MACROMASTIA: Primary | ICD-10-CM

## 2025-01-02 RX ORDER — SULFAMETHOXAZOLE AND TRIMETHOPRIM 800; 160 MG/1; MG/1
1 TABLET ORAL 2 TIMES DAILY
Qty: 20 TABLET | Refills: 0 | Status: SHIPPED | OUTPATIENT
Start: 2025-01-02 | End: 2025-01-12

## 2025-01-02 NOTE — TELEPHONE ENCOUNTER
"Spoke with pt to review her [employer] message, per Karina William OK to start abx and see pt in clinic next week. Rx sent, appt arranged.       Per [employer] scheduling request message pt stated:    \"Symptoms started around Christmas day so 7 days ago.   Right breast showing some redness, increased warmth, tenderness and having a tight feeling and swelling.   Redness has not spread but has not improved.   Swelling is staying the same.   No fevers, drainage or ill feeling.   I showed my Mom who is an emergency room RN on [place] 30th. She  monitoring it stating no change but some concern. Lower portion of right breast appears shiny, tight, reddened, slightly swollen but no change since the 30th.\"    "

## 2025-01-07 ENCOUNTER — OFFICE VISIT (OUTPATIENT)
Dept: PLASTIC SURGERY | Facility: CLINIC | Age: 25
End: 2025-01-07
Attending: PLASTIC SURGERY
Payer: COMMERCIAL

## 2025-01-07 VITALS
BODY MASS INDEX: 23.79 KG/M2 | SYSTOLIC BLOOD PRESSURE: 129 MMHG | WEIGHT: 157 LBS | HEIGHT: 68 IN | DIASTOLIC BLOOD PRESSURE: 71 MMHG | OXYGEN SATURATION: 99 % | TEMPERATURE: 97.5 F | HEART RATE: 88 BPM

## 2025-01-07 DIAGNOSIS — N62 MACROMASTIA: Primary | ICD-10-CM

## 2025-01-07 PROCEDURE — 99213 OFFICE O/P EST LOW 20 MIN: CPT | Performed by: PLASTIC SURGERY

## 2025-01-07 NOTE — NURSING NOTE
"Oncology Rooming Note    January 7, 2025 9:12 AM   Mindi Bourne is a 24 year old female who presents for:    Chief Complaint   Patient presents with    Oncology Clinic Visit     Post-Op visit     Initial Vitals: /71   Pulse 88   Temp 97.5  F (36.4  C) (Tympanic)   Ht 1.727 m (5' 8\")   Wt 71.2 kg (157 lb)   SpO2 99%   BMI 23.87 kg/m   Estimated body mass index is 23.87 kg/m  as calculated from the following:    Height as of this encounter: 1.727 m (5' 8\").    Weight as of this encounter: 71.2 kg (157 lb). Body surface area is 1.85 meters squared.  Data Unavailable Comment: Data Unavailable   No LMP recorded. (Menstrual status: IUD).  Allergies reviewed: Yes  Medications reviewed: Yes    Medications: Medication refills not needed today.  Pharmacy name entered into [employer]:    [id number] - CLOS[facility] - [place], [place] - [address] [address] [id number] - [place], [place] - [address] AT [address] [place] [address] & [address]    Frailty Screening:   Is the patient here for a new oncology consult visit in cancer care? 2. No      Clinical concerns: none      Luis Carlos Carrero LPN              "

## 2025-01-07 NOTE — PROGRESS NOTES
PRESENTING COMPLAINT:  Post-operative visit status post bilateral breast reduction done 12/4/2024     HISTORY OF PRESENTING COMPLAINT: The patient is here for post-operative visit.  The patient is being seen in the presence of my nurse.     Here for regular postoperative visit.  Was started on antibiotics because of some redness.  Currently doing well with no issues.    On exam: Vital signs stable afebrile.  Both breasts are healed no evidence for infection.     ASSESSMENT AND PLAN:  Based upon the above findings, the patient is here for post-operative visit.     Reassured the patient.  Advised aggressive moisturization.  Finish the course of antibiotics.  Continue to get to know her breast well.  Continue with yearly mammograms at the age of 39.  See us back as needed.    All questions were answered.  The patient was happy with the visit.

## 2025-01-07 NOTE — LETTER
1/7/2025      Mindi Bourne  [address] [place] [address]      Dear Colleague,    Thank you for referring your patient, Mindi Bourne, to the [facility]. Please see a copy of my visit note below.    PRESENTING COMPLAINT:  Post-operative visit status post bilateral breast reduction done 12/4/2024     HISTORY OF PRESENTING COMPLAINT: The patient is here for post-operative visit.  The patient is being seen in the presence of my nurse.     Here for regular postoperative visit.  Was started on antibiotics because of some redness.  Currently doing well with no issues.    On exam: Vital signs stable afebrile.  Both breasts are healed no evidence for infection.     ASSESSMENT AND PLAN:  Based upon the above findings, the patient is here for post-operative visit.     Reassured the patient.  Advised aggressive moisturization.  Finish the course of antibiotics.  Continue to get to know her breast well.  Continue with yearly mammograms at the age of 39.  See us back as needed.    All questions were answered.  The patient was happy with the visit.      Again, thank you for allowing me to participate in the care of your patient.        Sincerely,        MARIELA Monsalve MD    Electronically signed

## 2025-01-11 ENCOUNTER — MYC REFILL (OUTPATIENT)
Dept: FAMILY MEDICINE | Facility: CLINIC | Age: 25
End: 2025-01-11
Payer: COMMERCIAL

## 2025-01-11 DIAGNOSIS — F41.1 GAD (GENERALIZED ANXIETY DISORDER): ICD-10-CM

## 2025-01-13 RX ORDER — HYDROXYZINE PAMOATE 25 MG/1
25-50 CAPSULE ORAL DAILY PRN
Qty: 30 CAPSULE | Refills: 0 | Status: SHIPPED | OUTPATIENT
Start: 2025-01-13

## 2025-02-25 DIAGNOSIS — F41.1 GAD (GENERALIZED ANXIETY DISORDER): ICD-10-CM

## 2025-02-25 RX ORDER — HYDROXYZINE PAMOATE 25 MG/1
CAPSULE ORAL
Qty: 30 CAPSULE | Refills: 0 | Status: SHIPPED | OUTPATIENT
Start: 2025-02-25

## 2025-03-01 ENCOUNTER — MYC REFILL (OUTPATIENT)
Dept: FAMILY MEDICINE | Facility: CLINIC | Age: 25
End: 2025-03-01
Payer: COMMERCIAL

## 2025-03-01 DIAGNOSIS — G47.00 INSOMNIA, UNSPECIFIED TYPE: ICD-10-CM

## 2025-03-01 DIAGNOSIS — F41.1 GAD (GENERALIZED ANXIETY DISORDER): ICD-10-CM

## 2025-03-03 RX ORDER — HYDROXYZINE PAMOATE 25 MG/1
CAPSULE ORAL
Qty: 30 CAPSULE | Refills: 0 | OUTPATIENT
Start: 2025-03-03

## 2025-03-03 RX ORDER — FLUOXETINE HYDROCHLORIDE 40 MG/1
80 CAPSULE ORAL DAILY
Qty: 180 CAPSULE | Refills: 3 | OUTPATIENT
Start: 2025-03-03

## 2025-03-20 ENCOUNTER — MYC MEDICAL ADVICE (OUTPATIENT)
Dept: FAMILY MEDICINE | Facility: CLINIC | Age: 25
End: 2025-03-20
Payer: COMMERCIAL

## 2025-03-20 DIAGNOSIS — G47.00 INSOMNIA, UNSPECIFIED TYPE: ICD-10-CM

## 2025-03-20 DIAGNOSIS — F41.1 GAD (GENERALIZED ANXIETY DISORDER): ICD-10-CM

## 2025-03-20 RX ORDER — FLUOXETINE HYDROCHLORIDE 40 MG/1
80 CAPSULE ORAL DAILY
Qty: 180 CAPSULE | Refills: 2 | Status: SHIPPED | OUTPATIENT
Start: 2025-03-20

## 2025-03-20 RX ORDER — BUPROPION HYDROCHLORIDE 150 MG/1
150 TABLET ORAL EVERY MORNING
Qty: 90 TABLET | Refills: 2 | Status: SHIPPED | OUTPATIENT
Start: 2025-03-20

## 2025-03-20 NOTE — TELEPHONE ENCOUNTER
Routing to Kassi. Please see pt's MCM and advise. Pt is wanting as many refills as possible as she will be off of her parents insurance soon. T'd up both medications, please add as many refills as appropriate.     Last visit was on 12/17/24 (virtual), last OV was on 11/4/24 for a pre-op.    BALYEE CabreraN, RN     [facility]    03/20/2025 at 9:49 AM

## 2025-03-23 ENCOUNTER — HEALTH MAINTENANCE LETTER (OUTPATIENT)
Age: 25
End: 2025-03-23

## 2025-04-29 ENCOUNTER — PATIENT OUTREACH (OUTPATIENT)
Dept: CARE COORDINATION | Facility: CLINIC | Age: 25
End: 2025-04-29
Payer: COMMERCIAL

## (undated) DEVICE — NEPTUNE SMOKE EVAC ADAPTER

## (undated) DEVICE — GLOVE BIOGEL PI MICRO SZ 7.0 48570

## (undated) DEVICE — SUCTION MANIFOLD NEPTUNE 2 SYS 1 PORT 702-025-000

## (undated) DEVICE — DRSG ABDOMINAL 07 1/2X8" 7197D

## (undated) DEVICE — BLADE KNIFE SURG 10 371110

## (undated) DEVICE — SU MONOCRYL 2-0 SH 27" UND Y417H

## (undated) DEVICE — ESU GROUND PAD ADULT W/CORD E7507

## (undated) DEVICE — DRSG KERLIX 4 1/2"X4YDS ROLL 6730

## (undated) DEVICE — SPONGE LAP 18X18" X8435

## (undated) DEVICE — FILTER HEPA FLUID TRAP NEPTUNE 0703-040-001

## (undated) DEVICE — LINEN TOWEL PACK X5 5464

## (undated) DEVICE — BNDG ELASTIC 6" DBL LENGTH UNSTERILE 6611-16

## (undated) DEVICE — SOL NACL 0.9% IRRIG 500ML BOTTLE 2F7123

## (undated) DEVICE — STPL SKIN 35W APPOSE 8886803712

## (undated) DEVICE — PEN MARKING SKIN W/PAPER RULER 31145785

## (undated) DEVICE — GLOVE BIOGEL PI MICRO INDICATOR UNDERGLOVE SZ 7.5 48975

## (undated) DEVICE — DRAPE IOBAN INCISE 23X17" 6650EZ

## (undated) DEVICE — DRAPE U SPLIT 74X120" 29440

## (undated) DEVICE — ESU ELEC BLADE HEX-LOCKING 2.5" E1450X

## (undated) DEVICE — SU DERMABOND PRINEO 42CM CLR422US

## (undated) DEVICE — PACK ASC BREAST SBA1BPUMA / SB15BPUM1

## (undated) DEVICE — PAD CHUX UNDERPAD 30X36" P3036C

## (undated) DEVICE — SU VICRYL 2-0 TIE 54" J615H

## (undated) DEVICE — PAD ARMBOARD FOAM EGGCRATE COVIDEN 3114367

## (undated) DEVICE — PREP CHLORAPREP 26ML TINTED HI-LITE ORANGE 930815

## (undated) RX ORDER — IBUPROFEN 200 MG
TABLET ORAL
Status: DISPENSED
Start: 2024-12-04

## (undated) RX ORDER — CEFAZOLIN SODIUM 2 G/50ML
SOLUTION INTRAVENOUS
Status: DISPENSED
Start: 2024-12-04

## (undated) RX ORDER — FENTANYL CITRATE 50 UG/ML
INJECTION, SOLUTION INTRAMUSCULAR; INTRAVENOUS
Status: DISPENSED
Start: 2024-12-04

## (undated) RX ORDER — PROPOFOL 10 MG/ML
INJECTION, EMULSION INTRAVENOUS
Status: DISPENSED
Start: 2024-12-04

## (undated) RX ORDER — HYDROMORPHONE HYDROCHLORIDE 1 MG/ML
INJECTION, SOLUTION INTRAMUSCULAR; INTRAVENOUS; SUBCUTANEOUS
Status: DISPENSED
Start: 2024-12-04

## (undated) RX ORDER — OXYCODONE HYDROCHLORIDE 5 MG/1
TABLET ORAL
Status: DISPENSED
Start: 2024-12-04

## (undated) RX ORDER — ACETAMINOPHEN 325 MG/1
TABLET ORAL
Status: DISPENSED
Start: 2024-12-04

## (undated) RX ORDER — TRANEXAMIC ACID 100 MG/ML
INJECTION, SOLUTION INTRAVENOUS
Status: DISPENSED
Start: 2024-12-04